# Patient Record
Sex: FEMALE | Race: WHITE | Employment: FULL TIME | URBAN - METROPOLITAN AREA
[De-identification: names, ages, dates, MRNs, and addresses within clinical notes are randomized per-mention and may not be internally consistent; named-entity substitution may affect disease eponyms.]

---

## 2017-01-25 ENCOUNTER — HOSPITAL ENCOUNTER (OUTPATIENT)
Dept: MAMMOGRAPHY | Age: 52
Discharge: HOME | End: 2017-01-25
Attending: INTERNAL MEDICINE
Payer: COMMERCIAL

## 2017-01-25 DIAGNOSIS — Z12.39 BREAST CANCER SCREENING: ICD-10-CM

## 2017-01-25 PROCEDURE — 77067 SCR MAMMO BI INCL CAD: CPT

## 2018-07-04 ENCOUNTER — HOSPITAL ENCOUNTER (EMERGENCY)
Age: 53
Discharge: HOME | End: 2018-07-04
Attending: EMERGENCY MEDICINE
Payer: COMMERCIAL

## 2018-07-04 ENCOUNTER — APPOINTMENT (OUTPATIENT)
Dept: CT IMAGING | Age: 53
End: 2018-07-04
Attending: EMERGENCY MEDICINE
Payer: COMMERCIAL

## 2018-07-04 ENCOUNTER — APPOINTMENT (OUTPATIENT)
Dept: GENERAL RADIOLOGY | Age: 53
End: 2018-07-04
Attending: EMERGENCY MEDICINE
Payer: COMMERCIAL

## 2018-07-04 VITALS
SYSTOLIC BLOOD PRESSURE: 112 MMHG | RESPIRATION RATE: 18 BRPM | OXYGEN SATURATION: 97 % | DIASTOLIC BLOOD PRESSURE: 58 MMHG | TEMPERATURE: 98.7 F | HEIGHT: 67 IN | BODY MASS INDEX: 29.03 KG/M2 | HEART RATE: 77 BPM | WEIGHT: 185 LBS

## 2018-07-04 DIAGNOSIS — K57.10 DIVERTICULOSIS OF SMALL INTESTINE WITHOUT HEMORRHAGE: Primary | ICD-10-CM

## 2018-07-04 DIAGNOSIS — K29.60 OTHER GASTRITIS WITHOUT HEMORRHAGE, UNSPECIFIED CHRONICITY: ICD-10-CM

## 2018-07-04 PROBLEM — K29.00 ACUTE GASTRITIS WITHOUT HEMORRHAGE: Status: ACTIVE | Noted: 2018-07-04

## 2018-07-04 PROBLEM — K57.12 DIVERTICULITIS OF JEJUNUM: Status: ACTIVE | Noted: 2018-07-04

## 2018-07-04 PROBLEM — R79.82 CRP ELEVATED: Status: ACTIVE | Noted: 2018-07-04

## 2018-07-04 PROBLEM — R10.13 EPIGASTRIC PAIN: Status: ACTIVE | Noted: 2018-07-04

## 2018-07-04 LAB
ALBUMIN SERPL-MCNC: 3.6 G/DL (ref 3.5–5)
ALBUMIN/GLOB SERPL: 1 {RATIO} (ref 1.2–3.5)
ALP SERPL-CCNC: 77 U/L (ref 50–136)
ALT SERPL-CCNC: 30 U/L (ref 12–65)
ANION GAP SERPL CALC-SCNC: 5 MMOL/L (ref 7–16)
AST SERPL-CCNC: 17 U/L (ref 15–37)
BASOPHILS # BLD: 0 K/UL (ref 0–0.2)
BASOPHILS NFR BLD: 0 % (ref 0–2)
BILIRUB SERPL-MCNC: 0.6 MG/DL (ref 0.2–1.1)
BUN SERPL-MCNC: 12 MG/DL (ref 6–23)
CALCIUM SERPL-MCNC: 9.2 MG/DL (ref 8.3–10.4)
CHLORIDE SERPL-SCNC: 102 MMOL/L (ref 98–107)
CO2 SERPL-SCNC: 28 MMOL/L (ref 21–32)
CREAT SERPL-MCNC: 0.82 MG/DL (ref 0.6–1)
CRP SERPL-MCNC: 15.7 MG/DL (ref 0–0.9)
DIFFERENTIAL METHOD BLD: ABNORMAL
EOSINOPHIL # BLD: 0.1 K/UL (ref 0–0.8)
EOSINOPHIL NFR BLD: 1 % (ref 0.5–7.8)
ERYTHROCYTE [DISTWIDTH] IN BLOOD BY AUTOMATED COUNT: 12.6 % (ref 11.9–14.6)
GLOBULIN SER CALC-MCNC: 3.7 G/DL (ref 2.3–3.5)
GLUCOSE SERPL-MCNC: 113 MG/DL (ref 65–100)
HCT VFR BLD AUTO: 40.7 % (ref 35.8–46.3)
HGB BLD-MCNC: 13.8 G/DL (ref 11.7–15.4)
IMM GRANULOCYTES # BLD: 0 K/UL (ref 0–0.5)
IMM GRANULOCYTES NFR BLD AUTO: 0 % (ref 0–5)
LACTATE BLD-SCNC: 1.2 MMOL/L (ref 0.5–1.9)
LIPASE SERPL-CCNC: 132 U/L (ref 73–393)
LYMPHOCYTES # BLD: 1.8 K/UL (ref 0.5–4.6)
LYMPHOCYTES NFR BLD: 16 % (ref 13–44)
MCH RBC QN AUTO: 30.1 PG (ref 26.1–32.9)
MCHC RBC AUTO-ENTMCNC: 33.9 G/DL (ref 31.4–35)
MCV RBC AUTO: 88.9 FL (ref 79.6–97.8)
MONOCYTES # BLD: 0.5 K/UL (ref 0.1–1.3)
MONOCYTES NFR BLD: 5 % (ref 4–12)
NEUTS SEG # BLD: 8.7 K/UL (ref 1.7–8.2)
NEUTS SEG NFR BLD: 78 % (ref 43–78)
PLATELET # BLD AUTO: 219 K/UL (ref 150–450)
PMV BLD AUTO: 9.9 FL (ref 10.8–14.1)
POTASSIUM SERPL-SCNC: 4 MMOL/L (ref 3.5–5.1)
PROT SERPL-MCNC: 7.3 G/DL (ref 6.3–8.2)
RBC # BLD AUTO: 4.58 M/UL (ref 4.05–5.25)
SODIUM SERPL-SCNC: 135 MMOL/L (ref 136–145)
WBC # BLD AUTO: 11.1 K/UL (ref 4.3–11.1)

## 2018-07-04 PROCEDURE — C9113 INJ PANTOPRAZOLE SODIUM, VIA: HCPCS | Performed by: SURGERY

## 2018-07-04 PROCEDURE — 86140 C-REACTIVE PROTEIN: CPT | Performed by: EMERGENCY MEDICINE

## 2018-07-04 PROCEDURE — 83605 ASSAY OF LACTIC ACID: CPT

## 2018-07-04 PROCEDURE — 85025 COMPLETE CBC W/AUTO DIFF WBC: CPT | Performed by: EMERGENCY MEDICINE

## 2018-07-04 PROCEDURE — 74022 RADEX COMPL AQT ABD SERIES: CPT

## 2018-07-04 PROCEDURE — 80053 COMPREHEN METABOLIC PANEL: CPT | Performed by: EMERGENCY MEDICINE

## 2018-07-04 PROCEDURE — 74011000250 HC RX REV CODE- 250: Performed by: EMERGENCY MEDICINE

## 2018-07-04 PROCEDURE — 99285 EMERGENCY DEPT VISIT HI MDM: CPT | Performed by: EMERGENCY MEDICINE

## 2018-07-04 PROCEDURE — 74011000250 HC RX REV CODE- 250: Performed by: SURGERY

## 2018-07-04 PROCEDURE — 74011250636 HC RX REV CODE- 250/636: Performed by: SURGERY

## 2018-07-04 PROCEDURE — 96375 TX/PRO/DX INJ NEW DRUG ADDON: CPT | Performed by: EMERGENCY MEDICINE

## 2018-07-04 PROCEDURE — 96365 THER/PROPH/DIAG IV INF INIT: CPT | Performed by: EMERGENCY MEDICINE

## 2018-07-04 PROCEDURE — 83690 ASSAY OF LIPASE: CPT | Performed by: EMERGENCY MEDICINE

## 2018-07-04 PROCEDURE — 74177 CT ABD & PELVIS W/CONTRAST: CPT

## 2018-07-04 PROCEDURE — 74011000258 HC RX REV CODE- 258: Performed by: SURGERY

## 2018-07-04 PROCEDURE — 81003 URINALYSIS AUTO W/O SCOPE: CPT | Performed by: EMERGENCY MEDICINE

## 2018-07-04 PROCEDURE — 74011636320 HC RX REV CODE- 636/320: Performed by: EMERGENCY MEDICINE

## 2018-07-04 PROCEDURE — 74011250636 HC RX REV CODE- 250/636: Performed by: EMERGENCY MEDICINE

## 2018-07-04 PROCEDURE — 74011250637 HC RX REV CODE- 250/637: Performed by: EMERGENCY MEDICINE

## 2018-07-04 PROCEDURE — 74011000258 HC RX REV CODE- 258: Performed by: EMERGENCY MEDICINE

## 2018-07-04 RX ORDER — KETOROLAC TROMETHAMINE 30 MG/ML
30 INJECTION, SOLUTION INTRAMUSCULAR; INTRAVENOUS
Status: COMPLETED | OUTPATIENT
Start: 2018-07-04 | End: 2018-07-04

## 2018-07-04 RX ORDER — LIDOCAINE HYDROCHLORIDE 20 MG/ML
15 SOLUTION OROPHARYNGEAL
Status: COMPLETED | OUTPATIENT
Start: 2018-07-04 | End: 2018-07-04

## 2018-07-04 RX ORDER — HYOSCYAMINE SULFATE 0.12 MG/1
0.12 TABLET SUBLINGUAL
Status: COMPLETED | OUTPATIENT
Start: 2018-07-04 | End: 2018-07-04

## 2018-07-04 RX ORDER — SODIUM CHLORIDE 0.9 % (FLUSH) 0.9 %
10 SYRINGE (ML) INJECTION
Status: COMPLETED | OUTPATIENT
Start: 2018-07-04 | End: 2018-07-04

## 2018-07-04 RX ORDER — SULFAMETHOXAZOLE AND TRIMETHOPRIM 800; 160 MG/1; MG/1
1 TABLET ORAL 2 TIMES DAILY
Qty: 14 TAB | Refills: 0 | Status: SHIPPED | OUTPATIENT
Start: 2018-07-04 | End: 2018-07-11

## 2018-07-04 RX ADMIN — LIDOCAINE HYDROCHLORIDE 15 ML: 20 SOLUTION ORAL; TOPICAL at 20:05

## 2018-07-04 RX ADMIN — SODIUM CHLORIDE 80 MG: 9 INJECTION, SOLUTION INTRAMUSCULAR; INTRAVENOUS; SUBCUTANEOUS at 21:01

## 2018-07-04 RX ADMIN — Medication 10 ML: at 18:54

## 2018-07-04 RX ADMIN — Medication 30 ML: at 20:05

## 2018-07-04 RX ADMIN — SODIUM CHLORIDE 100 ML: 900 INJECTION, SOLUTION INTRAVENOUS at 18:54

## 2018-07-04 RX ADMIN — HYOSCYAMINE SULFATE 0.12 MG: 0.12 TABLET ORAL; SUBLINGUAL at 19:10

## 2018-07-04 RX ADMIN — KETOROLAC TROMETHAMINE 30 MG: 30 INJECTION, SOLUTION INTRAMUSCULAR at 18:13

## 2018-07-04 RX ADMIN — IOPAMIDOL 100 ML: 755 INJECTION, SOLUTION INTRAVENOUS at 18:54

## 2018-07-04 RX ADMIN — SODIUM CHLORIDE 1 G: 900 INJECTION, SOLUTION INTRAVENOUS at 21:29

## 2018-07-04 NOTE — ED TRIAGE NOTES
Presents to ED with c/o gradual onset intermittent 8/10 stabbing epigastric pain radiating to mid lower back for approx. 24 hour. Pt reports nausea, but denies vomiting and diarrhea. Denies fever.

## 2018-07-04 NOTE — ED PROVIDER NOTES
HPI Comments: Patient presents with complaints of epigastric abdominal pain sharp and stabbing in nature radiating through to the back onset yesterday. Pain waxes and wanes but never resolves. She had some nausea yesterday but no vomiting no diarrhea no fever. Pain is worse with certain positions and with deep inspiration. Patient is a 47 y.o. female presenting with abdominal pain. The history is provided by the patient. Abdominal Pain    This is a new problem. The current episode started yesterday. The problem occurs constantly. The problem has not changed since onset. The pain is associated with an unknown factor. The pain is located in the epigastric region. The quality of the pain is sharp. The pain is moderate. Associated symptoms include nausea. Pertinent negatives include no anorexia, no fever, no belching, no diarrhea, no flatus, no hematochezia, no melena, no vomiting, no constipation, no dysuria, no frequency, no hematuria, no headaches, no arthralgias, no myalgias, no trauma, no chest pain and no back pain. Nothing worsens the pain. The pain is relieved by nothing. The patient's surgical history includes cholecystectomy.        Past Medical History:   Diagnosis Date    Arthritis     Asthma     no exacerbations since before 2008    H/O seasonal allergies        Past Surgical History:   Procedure Laterality Date    HX CHOLECYSTECTOMY      HX DILATION AND CURETTAGE  11/2010    HX HYSTERECTOMY  5/2011    No BSO         Family History:   Problem Relation Age of Onset    Cancer Maternal Grandmother      Colon    Cancer Paternal Grandmother      Breast    Breast Cancer Paternal Grandmother      Age unknown    Stroke Paternal Grandfather     Hypertension Mother     Anemia Mother     Hypertension Father     High Cholesterol Father     Coronary Artery Disease Brother        Social History     Social History    Marital status:      Spouse name: N/A    Number of children: N/A    Years of education: N/A     Occupational History    Not on file. Social History Main Topics    Smoking status: Never Smoker    Smokeless tobacco: Never Used    Alcohol use No    Drug use: No    Sexual activity: Not on file     Other Topics Concern    Not on file     Social History Narrative         ALLERGIES: Review of patient's allergies indicates no known allergies. Review of Systems   Constitutional: Negative for chills and fever. Cardiovascular: Negative for chest pain. Gastrointestinal: Positive for abdominal pain and nausea. Negative for anorexia, constipation, diarrhea, flatus, hematochezia, melena and vomiting. Genitourinary: Negative for dysuria, frequency and hematuria. Musculoskeletal: Negative for arthralgias, back pain and myalgias. Neurological: Negative for headaches. All other systems reviewed and are negative. Vitals:    07/04/18 1723 07/04/18 1738   BP:  136/85   Pulse: 86    Resp: 18    Temp: 98.7 °F (37.1 °C)    SpO2: 99%    Weight: 83.9 kg (185 lb)    Height: 5' 7\" (1.702 m)             Physical Exam   Constitutional: She is oriented to person, place, and time. She appears well-developed and well-nourished. No distress. HENT:   Head: Normocephalic and atraumatic. Mouth/Throat: No oropharyngeal exudate. Eyes: Conjunctivae and EOM are normal. Pupils are equal, round, and reactive to light. Neck: Normal range of motion. Neck supple. Cardiovascular: Normal rate, regular rhythm and normal heart sounds. Pulmonary/Chest: Effort normal and breath sounds normal.   Abdominal: Soft. Bowel sounds are normal. There is tenderness. Musculoskeletal: Normal range of motion. Neurological: She is alert and oriented to person, place, and time. Skin: Skin is warm and dry. Psychiatric: She has a normal mood and affect. Her behavior is normal.   Nursing note and vitals reviewed.        MDM  Number of Diagnoses or Management Options     Amount and/or Complexity of Data Reviewed  Clinical lab tests: ordered and reviewed  Tests in the radiology section of CPT®: ordered and reviewed  Independent visualization of images, tracings, or specimens: yes    Risk of Complications, Morbidity, and/or Mortality  Presenting problems: high  Diagnostic procedures: moderate  Management options: moderate    Patient Progress  Patient progress: stable        ED Course       Procedures      At reevaluation patient reports pain is decreased to 2/10 in intensity however repeat abdominal exam continues to demonstrate some epigastric left upper quadrant tenderness. Further history regarding NSAID use patient takes 81 mg of aspirin daily and one Celebrex. Patient has a abnormal CT report from radiology. Case was discussed with Dr. Namrata Whalen will be coming to evaluate the patient.

## 2018-07-05 NOTE — CONSULTS
H&P/Consult Note/Progress Note/Office Note:   Heriberto Lai  MRN: [id number]  :1965  Age:52 y.o.    HPI: Heriberto Lai is a 47 y.o. female who with h/o gastritis on celebrex and ASA daily who had at least 3 mixed drinks 4 days ago came to the ER with a 2 day history of intermittent, moderate epigastric pain with radiation to her back, which was worsened by moving and deep inspiration and not releived by anything. Pain is sharp and stabbing. She has associated nausea but no vomiting. She reports a history of arthritis in her hands and feet and previously was on arthritis medication (she did not remeber the name)  that she had to stop because of epigastric pain in the past.  She took Pepcid at that time and her symptoms resolved. She reports flatus and BM daily. A few weeks ago she began drinking a vinegar based drink frequently and had to stop secondary tot some epigastric pain. She has since been on Celebrex for about a year as well as ASA qday. She had 2 mixed drinks at least 4 days as mentioned previously. She was given Toradol by ER and surgery consult obtained. GI cocktail in ER resulted in immediate relief of her epigastric pain. She reports an unremarkable colonoscopy a few years ago here in [place] but does not remember the name of her GI physician. She is s/p lap andrea approx  by Dr Janell Weinstein  She reported she is s/p hysterectomy (ovaries remain) for benign disease related to dysfunctional bleeding by Dr Wes Ventura. 18 Abd Xray/Series  CLINICAL INDICATION:  Acute upper abdominal pain and nausea, pain radiates to  mid back, previous cholecystectomy and hysterectomy    FINDINGS: The lungs are well inflated. No evidence of infiltrate or effusion. Mediastinal contours are unremarkable.     Flat and upright views of the abdomen show no evidence of obstruction. A few nonspecific bowel loops are noted in the left abdomen.  No evidence of free air.  Cholecystectomy clips are noted. There is moderate to large volume stool consistent with constipation. No definite abnormal masses or calculi are seen. IMPRESSION:    1. No bowel obstruction or free air. 2. No acute findings in the chest.  3. Cholecystectomy    7/4/18 Ct abd/pelvis with IV but no oral contrast  Hx:  Acute moderate to severe stabbing epigastric pain with nausea, elevated CRP. Prior cholecystectomy.     FINDINGS: The partially seen lung bases are clear.     Abdomen:  No suspicious lesions in the liver or spleen. The adrenal glands and pancreas appear unremarkable. There is normal enhancement of the kidneys, no hydronephrosis. Cholecystectomy clips with no obvious complication.     No lymphadenopathy, free air, focal inflammatory changes or fluid collections in the abdomen. Aorta normal caliber. Patent major vessels.     In the left upper quadrant there are a few jejunal loops which are borderline dilated, with mild nonspecific wall thickening, some intraluminal debris, and trace surrounding mesenteric fluid. There is a well-defined 3 cm oval collection of fluid and gas adjacent to several small bowel loops but not definitely within the lumen, which may indicate a diverticulum or possibly abscess (axial image 31, coronal image 37). These are located lateral to the descending colon raising question of an internal hernia. There is no definite bowel obstruction. GI evaluation is limited without oral contrast. Moderately prominent stool throughout large bowel suggesting constipation. Retrocecal appendix appears unremarkable.     Pelvis:  No acute inflammatory changes or fluid collections in the pelvis. Uterus is absent. Ovaries are not enlarged. Urinary bladder is mostly decompressed.  No lymphadenopathy or mass.     Bones: Small sclerotic focus in the right lateral sacrum is of doubtful significance,   possibly bone island or chronic degenerative spurring No acute osseous lesions are seen.      IMPRESSION:   1. Left upper abdominal small bowel loops are abnormal and nonspecific. Possible small abscess or diverticulum. GI tract and adjacent structures are limited without oral contrast.  2. Cholecystectomy. 3. Partial hysterectomy            Past Medical History:   Diagnosis Date    Arthritis     Asthma     no exacerbations since before 2008    H/O seasonal allergies      Past Surgical History:   Procedure Laterality Date    HX CHOLECYSTECTOMY      HX DILATION AND CURETTAGE  11/2010    HX HYSTERECTOMY  5/2011    No BSO     Current Facility-Administered Medications   Medication Dose Route Frequency    GI Cocktail  30 mL Oral Q6H    pantoprazole (PROTONIX) 80 mg in sodium chloride 0.9% 10 mL injection  80 mg IntraVENous NOW    ertapenem (INVANZ) 1 g in 0.9% sodium chloride (MBP/ADV) 50 mL MBP  1 g IntraVENous Q24H     Current Outpatient Prescriptions   Medication Sig    meloxicam (MOBIC) 7.5 mg tablet Take  by mouth daily.  OMEPRAZOLE PO Take  by mouth. Indications: OTC    celecoxib (CELEBREX) 100 mg capsule Take 1 Cap by mouth two (2) times daily as needed for Pain.  predniSONE (DELTASONE) 10 mg tablet 4 PO QD x 2, 3 PO QD x 2, 2 PO QD x 2, 1 PO QD x 2    ibuprofen (MOTRIN) 200 mg tablet Take  by mouth.  loratadine (CLARITIN) 10 mg tablet Take 10 mg by mouth daily. Instructed to take DOS per anesthesia guidelines      multivitamin capsule Take 1 Cap by mouth daily.  calcium-vitamin D (CALCIUM 500+D) 500 mg(1,250mg) -200 unit per tablet Take 1 Tab by mouth daily.  aspirin delayed-release 81 mg tablet Take 81 mg by mouth daily. Review of patient's allergies indicates no known allergies.   Social History     Social History    Marital status:      Spouse name: N/A    Number of children: N/A    Years of education: N/A     Social History Main Topics    Smoking status: Never Smoker    Smokeless tobacco: Never Used    Alcohol use No    Drug use: No    Sexual activity: Not on file     Other Topics Concern    Not on file     Social History Narrative     History   Smoking Status    Never Smoker   Smokeless Tobacco    Never Used     Family History   Problem Relation Age of Onset    Cancer Maternal Grandmother      Colon    Cancer Paternal Grandmother      Breast    Breast Cancer Paternal [de-identified]      Age unknown    Stroke Paternal Grandfather     Hypertension Mother     Anemia Mother     Hypertension Father     High Cholesterol Father     Coronary Artery Disease Brother      ROS: The patient has no difficulty with chest pain or shortness of breath. No fever or chills. Comprehensive review of systems was otherwise unremarkable except as noted above. Physical Exam:   Visit Vitals    /85    Pulse 86    Temp 98.7 °F (37.1 °C)    Resp 18    Ht 5' 7\" (1.702 m)    Wt 185 lb (83.9 kg)    SpO2 99%    BMI 28.98 kg/m2     Constitutional: Alert, oriented, cooperative patient in no acute distress; appears stated age    Eyes:Sclera are clear. EOMs intact  ENMT: no external lesions gross hearing normal; no obvious neck masses, no ear or lip lesions, nares normal  CV: RRR. Normal perfusion  Resp: No JVD. Breathing is  non-labored; no audible wheezing. GI: soft and non-distended; epigastric tenderness to deep palpation    Musculoskeletal: unremarkable with normal function. No embolic signs or cyanosis.    Neuro:  Oriented; moves all 4; no focal deficits  Psychiatric: normal affect and mood, no memory impairment    Recent vitals (if inpt):  Patient Vitals for the past 24 hrs:   BP Temp Pulse Resp SpO2 Height Weight   07/04/18 1738 136/85 - - - - - -   07/04/18 1723 - 98.7 °F (37.1 °C) 86 18 99 % 5' 7\" (1.702 m) 185 lb (83.9 kg)       Labs:  Recent Labs      07/04/18   1807   WBC  11.1   HGB  13.8   PLT  219   NA  135*   K  4.0   CL  102   CO2  28   BUN  12   CREA  0.82   GLU  113*   TBILI  0.6   SGOT  17   ALT  30   AP  77   LPSE  132       Lab Results Component Value Date/Time    WBC 11.1 07/04/2018 06:07 PM    HGB 13.8 07/04/2018 06:07 PM    PLATELET 588 95/78/2897 06:07 PM    Sodium 135 (L) 07/04/2018 06:07 PM    Potassium 4.0 07/04/2018 06:07 PM    Chloride 102 07/04/2018 06:07 PM    CO2 28 07/04/2018 06:07 PM    BUN 12 07/04/2018 06:07 PM    Creatinine 0.82 07/04/2018 06:07 PM    Glucose 113 (H) 07/04/2018 06:07 PM    INR 1.0 04/21/2011 09:16 AM    aPTT 25.7 04/21/2011 09:16 AM    Bilirubin, total 0.6 07/04/2018 06:07 PM    Bilirubin, direct 0.1 08/11/2014 11:58 AM    AST (SGOT) 17 07/04/2018 06:07 PM    ALT (SGPT) 30 07/04/2018 06:07 PM    Alk. phosphatase 77 07/04/2018 06:07 PM    Lipase 132 07/04/2018 06:07 PM       I reviewed recent labs and recent radiologic studies. I independently reviewed radiology images for studies I described above or studies I have ordered. Admission date (for inpatients): 7/4/2018   * No surgery found *  * No surgery found *    ASSESSMENT/PLAN:  Problem List  Date Reviewed: 8/11/2014          Codes Class Noted    Epigastric pain ICD-10-CM: R10.13  ICD-9-CM: 789.06  7/4/2018        CRP elevated ICD-10-CM: R79.82  ICD-9-CM: 790.95  7/4/2018        Diverticulitis of jejunum ICD-10-CM: K57.12  ICD-9-CM: 562.01  7/4/2018        * (Principal)Acute gastritis without hemorrhage ICD-10-CM: K29.00  ICD-9-CM: 535.00  7/4/2018        Arthritis ICD-10-CM: M19.90  ICD-9-CM: 716.90  Unknown    Overview Signed 4/17/2018 11:48 AM by Martin Starkey 150, knees, ankles, feet             OA (osteoarthritis) ICD-10-CM: M19.90  ICD-9-CM: 715.90  Unknown            Principal Problem:    Acute gastritis without hemorrhage (7/4/2018)    Active Problems:    Epigastric pain (7/4/2018)      CRP elevated (7/4/2018)      Diverticulitis of jejunum (7/4/2018)       Findings and response to GI cocktail suggestive of gastritis  Protonix IV ordered in ER  I asked her to avoid alcohol, and temporarily stop ASA and celebrex.   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   I placed her on oral ABx (Bactrim) for now after Invanz x 1 in ER            Signed:  Mikey Hardy MD,  FACS

## 2018-07-05 NOTE — ED NOTES
I have reviewed discharge instructions with the patient. The patient verbalized understanding. Patient left ED via Discharge Method: ambulatory to Home with (). Opportunity for questions and clarification provided. Patient given 1 scripts. To continue your aftercare when you leave the hospital, you may receive an automated call from our care team to check in on how you are doing. This is a free service and part of our promise to provide the best care and service to meet your aftercare needs.  If you have questions, or wish to unsubscribe from this service please call [phone]. Thank you for Choosing our [facility].

## 2018-07-05 NOTE — DISCHARGE INSTRUCTIONS
Diverticulosis: Care Instructions  Your Care Instructions  In diverticulosis, pouches called diverticula form in the wall of the large intestine (colon). The pouches do not cause any pain or other symptoms. Most people who have diverticulosis do not know they have it. But the pouches sometimes bleed, and if they become infected, they can cause pain and other symptoms. When this happens, it is called diverticulitis. Diverticula form when pressure pushes the wall of the colon outward at certain weak points. A diet that is too low in fiber can cause diverticula. Follow-up care is a key part of your treatment and safety. Be sure to make and go to all appointments, and call your doctor if you are having problems. It's also a good idea to know your test results and keep a list of the medicines you take. How can you care for yourself at home? · Include fruits, leafy green vegetables, beans, and whole grains in your diet each day. These foods are high in fiber. · Take a fiber supplement, such as Citrucel or Metamucil, every day if needed. Read and follow all instructions on the label. · Drink plenty of fluids, enough so that your urine is light yellow or clear like water. If you have kidney, heart, or liver disease and have to limit fluids, talk with your doctor before you increase the amount of fluids you drink. · Get at least 30 minutes of exercise on most days of the week. Walking is a good choice. You also may want to do other activities, such as running, swimming, cycling, or playing tennis or team sports. · Cut out foods that cause gas, pain, or other symptoms. When should you call for help? Call your doctor now or seek immediate medical care if:  ? · You have belly pain. ? · You pass maroon or very bloody stools. ? · You have a fever. ? · You have nausea and vomiting. ? · You have unusual changes in your bowel movements or abdominal swelling. ? · You have burning pain when you urinate.    ? · You have abnormal vaginal discharge. ? · You have shoulder pain. ? · You have cramping pain that does not get better when you have a bowel movement or pass gas. ? · You pass gas or stool from your urethra while urinating. ? Watch closely for changes in your health, and be sure to contact your doctor if you have any problems. Where can you learn more? Go to http://noa-adria.info/. Enter B711 in the search box to learn more about \"Diverticulosis: Care Instructions. \"  Current as of: May 12, 2017  Content Version: 11.4  © 4749-0466 [employer]. Care instructions adapted under license by [employer] (which disclaims liability or warranty for this information). If you have questions about a medical condition or this instruction, always ask your healthcare professional. [address] any warranty or liability for your use of this information. Gastritis: Care Instructions  Your Care Instructions    Gastritis is a sore and upset stomach. It happens when something irritates the stomach lining. Many things can cause it. These include an infection such as the flu or something you ate or drank. Medicines or a sore on the lining of the stomach (ulcer) also can cause it. Your belly may bloat and ache. You may belch, vomit, and feel sick to your stomach. You should be able to relieve the problem by taking medicine. And it may help to change your diet. If gastritis lasts, your doctor may prescribe medicine. Follow-up care is a key part of your treatment and safety. Be sure to make and go to all appointments, and call your doctor if you are having problems. It's also a good idea to know your test results and keep a list of the medicines you take. How can you care for yourself at home? · If your doctor prescribed antibiotics, take them as directed. Do not stop taking them just because you feel better.  You need to take the full course of antibiotics. · Be safe with medicines. If your doctor prescribed medicine to decrease stomach acid, take it as directed. Call your doctor if you think you are having a problem with your medicine. · Do not take any other medicine, including over-the-counter pain relievers, without talking to your doctor first.  · If your doctor recommends over-the-counter medicine to reduce stomach acid, such as Pepcid AC, Prilosec, Tagamet HB, or Zantac 75, follow the directions on the label. · Drink plenty of fluids (enough so that your urine is light yellow or clear like water) to prevent dehydration. Choose water and other caffeine-free clear liquids. If you have kidney, heart, or liver disease and have to limit fluids, talk with your doctor before you increase the amount of fluids you drink. · Limit how much alcohol you drink. · Avoid coffee, tea, cola drinks, chocolate, and other foods with caffeine. They increase stomach acid. When should you call for help? Call 911 anytime you think you may need emergency care. For example, call if:  ? · You vomit blood or what looks like coffee grounds. ? · You pass maroon or very bloody stools. ?Call your doctor now or seek immediate medical care if:  ? · You start breathing fast and have not produced urine in the last 8 hours. ? · You cannot keep fluids down. ? Watch closely for changes in your health, and be sure to contact your doctor if:  ? · You do not get better as expected. Where can you learn more? Go to http://noa-adria.info/. Enter [id number] in the search box to learn more about \"Gastritis: Care Instructions. \"  Current as of: May 12, 2017  Content Version: 11.4  © 4689-4134 [employer]. Care instructions adapted under license by [employer] (which disclaims liability or warranty for this information).  If you have questions about a medical condition or this instruction, always ask your healthcare professional. [employer] disclaims any warranty or liability for your use of this information. Avoid vinegar, smoking, alcohol, aspirin, and NSAIDS  Stop Celebrex for now.     Take Prilosec 20mg twice a day  Liquid diet for a few days  Bactrim (antibiotic) every 12 hrs    Follow-up with your Gastroenterologist in next 3-4 weeks to consider EGD for suspected gastritis    Follow-up with Dr Power Foster (Gen Surgery) to consider repeat CT scan abd/pelvis (with oral and IV contrast)   in 2-3 weeks or sooner if needed in the office at:  [address], [address]  (Call for an appt time-->[phone]-->option 1)

## 2018-07-30 ENCOUNTER — HOSPITAL ENCOUNTER (OUTPATIENT)
Dept: CT IMAGING | Age: 53
Discharge: HOME | End: 2018-07-30
Payer: COMMERCIAL

## 2018-07-30 DIAGNOSIS — R10.84 GENERALIZED ABDOMINAL PAIN: ICD-10-CM

## 2018-07-30 DIAGNOSIS — K57.92 DIVERTICULITIS OF INTESTINE, PART UNSPECIFIED, WITHOUT PERFORATION OR ABSCESS WITHOUT BLEEDING: ICD-10-CM

## 2018-07-30 PROCEDURE — 74011636320 HC RX REV CODE- 636/320

## 2018-07-30 PROCEDURE — 74011000258 HC RX REV CODE- 258

## 2018-07-30 PROCEDURE — 74177 CT ABD & PELVIS W/CONTRAST: CPT

## 2018-07-30 RX ORDER — SODIUM CHLORIDE 0.9 % (FLUSH) 0.9 %
10 SYRINGE (ML) INJECTION
Status: COMPLETED | OUTPATIENT
Start: 2018-07-30 | End: 2018-07-30

## 2018-07-30 RX ADMIN — DIATRIZOATE MEGLUMINE AND DIATRIZOATE SODIUM 15 ML: 660; 100 LIQUID ORAL; RECTAL at 12:18

## 2018-07-30 RX ADMIN — IOPAMIDOL 100 ML: 755 INJECTION, SOLUTION INTRAVENOUS at 12:18

## 2018-07-30 RX ADMIN — SODIUM CHLORIDE 100 ML: 900 INJECTION, SOLUTION INTRAVENOUS at 12:18

## 2018-07-30 RX ADMIN — Medication 10 ML: at 12:18

## 2018-08-16 ENCOUNTER — HOSPITAL ENCOUNTER (OUTPATIENT)
Dept: MAMMOGRAPHY | Age: 53
Discharge: HOME | End: 2018-08-16
Attending: INTERNAL MEDICINE
Payer: COMMERCIAL

## 2018-08-16 DIAGNOSIS — Z12.39 BREAST CANCER SCREENING: ICD-10-CM

## 2018-08-16 PROCEDURE — 77067 SCR MAMMO BI INCL CAD: CPT

## 2019-06-19 ENCOUNTER — ANESTHESIA EVENT (OUTPATIENT)
Dept: SURGERY | Age: 54
End: 2019-06-19
Payer: COMMERCIAL

## 2019-06-19 RX ORDER — SODIUM CHLORIDE 0.9 % (FLUSH) 0.9 %
5-40 SYRINGE (ML) INJECTION EVERY 8 HOURS
Status: CANCELLED | OUTPATIENT
Start: 2019-06-19

## 2019-06-19 RX ORDER — SODIUM CHLORIDE 0.9 % (FLUSH) 0.9 %
5-40 SYRINGE (ML) INJECTION AS NEEDED
Status: CANCELLED | OUTPATIENT
Start: 2019-06-19

## 2019-06-19 NOTE — H&P
Laureano Edgecomb Imanioedonny  1965  female    Follow up:  left index finger infection    HPI: Patient is a 46year old female who is following up for left index finger infection. Last visit we provided wound care. The current symptoms are no better. She put her work in order and now wants to proceed with surgery. ?????. Past Medical and Surgical, Family and Social History: This has been reviewed and documented on the patient intake form. See scanned documents for further information. Medications: Bactrim DS (800-160 MG, Take 1 tablet(s) by mouth 2 times a day for 10 days); Norco (5-325 MG, Take 1 tablet(s) by mouth every 4-6 hours as needed [PRN])  Allergies: NKDA  All medical history, medications and allergies have been discussed with the patient today. ROS:  This has been reviewed and documented on the patient intake form. See scanned documents for further information. Patient reports no change in past medical & surgical history, medications, allergies, social history, family history and review of systems since last visit    Physical Examination: Patient is a healthy appearing female in no acute distress. Bilateral upper limbs have equal and intact peripheral pulses. Skin does not demonstrate any rashes or lesions. Pain with motion of the left index DIP joint, purulent discharge from the dorsal wound, swelling dorsally, no pain volarly, no pain along flexor tendon sheath. Imaging:  None  ? Assessment: M00.9 Pyogenic arthritis, unspecified   M67.442 Mucous cyst of digit of left hand   [id number] Paronychia     Plan: We discussed the diagnosis and different treatment options. ?????. After thorough discussion, the patient elects to proceed with LEFT INDEX FINGER IRRIGATION AND DEBRIDEMENT. Patient voiced accordance and understanding of the information provided and the formulated plan. All questions were answered to the patient's satisfaction during the encounter. ?????         Patient understands risks and benefits of LEFT INDEX FINGER IRRIGATION AND DEBRIDEMENT including but not limited to nerve injury, vessel injury, infection, failure to achieve desired results and possible need for additional surgery. Patient understands and wishes to proceed with surgery.     On Exam:   The patient is alert and oriented; ;   Lung auscultation is clear bilaterally   Heart has RRR without murmurs    Jenise Cleary MD  06/19/19  4:23 PM

## 2019-06-20 ENCOUNTER — HOSPITAL ENCOUNTER (OUTPATIENT)
Age: 54
Setting detail: OUTPATIENT SURGERY
Discharge: HOME | End: 2019-06-20
Attending: ORTHOPAEDIC SURGERY | Admitting: ORTHOPAEDIC SURGERY
Payer: COMMERCIAL

## 2019-06-20 ENCOUNTER — ANESTHESIA (OUTPATIENT)
Dept: SURGERY | Age: 54
End: 2019-06-20
Payer: COMMERCIAL

## 2019-06-20 VITALS
TEMPERATURE: 97.5 F | DIASTOLIC BLOOD PRESSURE: 77 MMHG | OXYGEN SATURATION: 98 % | BODY MASS INDEX: 28.98 KG/M2 | SYSTOLIC BLOOD PRESSURE: 140 MMHG | HEART RATE: 66 BPM | WEIGHT: 185 LBS | RESPIRATION RATE: 16 BRPM

## 2019-06-20 PROCEDURE — 74011250636 HC RX REV CODE- 250/636: Performed by: ANESTHESIOLOGY

## 2019-06-20 PROCEDURE — 87075 CULTR BACTERIA EXCEPT BLOOD: CPT

## 2019-06-20 PROCEDURE — 76060000032 HC ANESTHESIA 0.5 TO 1 HR: Performed by: ORTHOPAEDIC SURGERY

## 2019-06-20 PROCEDURE — 74011250636 HC RX REV CODE- 250/636: Performed by: ORTHOPAEDIC SURGERY

## 2019-06-20 PROCEDURE — 76010010054 HC POST OP PAIN BLOCK: Performed by: ORTHOPAEDIC SURGERY

## 2019-06-20 PROCEDURE — 77030002888 HC SUT CHRMC J&J -A: Performed by: ORTHOPAEDIC SURGERY

## 2019-06-20 PROCEDURE — 87077 CULTURE AEROBIC IDENTIFY: CPT

## 2019-06-20 PROCEDURE — 87205 SMEAR GRAM STAIN: CPT

## 2019-06-20 PROCEDURE — 87102 FUNGUS ISOLATION CULTURE: CPT

## 2019-06-20 PROCEDURE — 77030002933 HC SUT MCRYL J&J -A: Performed by: ORTHOPAEDIC SURGERY

## 2019-06-20 PROCEDURE — 87176 TISSUE HOMOGENIZATION CULTR: CPT

## 2019-06-20 PROCEDURE — 76942 ECHO GUIDE FOR BIOPSY: CPT | Performed by: ORTHOPAEDIC SURGERY

## 2019-06-20 PROCEDURE — 87186 SC STD MICRODIL/AGAR DIL: CPT

## 2019-06-20 PROCEDURE — 87106 FUNGI IDENTIFICATION YEAST: CPT

## 2019-06-20 PROCEDURE — 74011250636 HC RX REV CODE- 250/636

## 2019-06-20 PROCEDURE — 76210000063 HC OR PH I REC FIRST 0.5 HR: Performed by: ORTHOPAEDIC SURGERY

## 2019-06-20 PROCEDURE — 76010000138 HC OR TIME 0.5 TO 1 HR: Performed by: ORTHOPAEDIC SURGERY

## 2019-06-20 PROCEDURE — 74011000250 HC RX REV CODE- 250: Performed by: ORTHOPAEDIC SURGERY

## 2019-06-20 PROCEDURE — 77030013479 HC CUF TRNQ COT DGT MARM -A: Performed by: ORTHOPAEDIC SURGERY

## 2019-06-20 PROCEDURE — 76210000021 HC REC RM PH II 0.5 TO 1 HR: Performed by: ORTHOPAEDIC SURGERY

## 2019-06-20 PROCEDURE — 77030002916 HC SUT ETHLN J&J -A: Performed by: ORTHOPAEDIC SURGERY

## 2019-06-20 PROCEDURE — 74011250637 HC RX REV CODE- 250/637: Performed by: ANESTHESIOLOGY

## 2019-06-20 RX ORDER — FENTANYL CITRATE 50 UG/ML
INJECTION, SOLUTION INTRAMUSCULAR; INTRAVENOUS AS NEEDED
Status: DISCONTINUED | OUTPATIENT
Start: 2019-06-20 | End: 2019-06-20 | Stop reason: HOSPADM

## 2019-06-20 RX ORDER — SODIUM CHLORIDE, SODIUM LACTATE, POTASSIUM CHLORIDE, CALCIUM CHLORIDE 600; 310; 30; 20 MG/100ML; MG/100ML; MG/100ML; MG/100ML
1000 INJECTION, SOLUTION INTRAVENOUS CONTINUOUS
Status: DISCONTINUED | OUTPATIENT
Start: 2019-06-20 | End: 2019-06-20 | Stop reason: HOSPADM

## 2019-06-20 RX ORDER — BACITRACIN 50000 [IU]/1
INJECTION, POWDER, FOR SOLUTION INTRAMUSCULAR AS NEEDED
Status: DISCONTINUED | OUTPATIENT
Start: 2019-06-20 | End: 2019-06-20 | Stop reason: HOSPADM

## 2019-06-20 RX ORDER — SODIUM CHLORIDE 0.9 % (FLUSH) 0.9 %
5-40 SYRINGE (ML) INJECTION EVERY 8 HOURS
Status: DISCONTINUED | OUTPATIENT
Start: 2019-06-20 | End: 2019-06-20 | Stop reason: HOSPADM

## 2019-06-20 RX ORDER — MIDAZOLAM HYDROCHLORIDE 1 MG/ML
2 INJECTION, SOLUTION INTRAMUSCULAR; INTRAVENOUS
Status: DISCONTINUED | OUTPATIENT
Start: 2019-06-20 | End: 2019-06-20 | Stop reason: HOSPADM

## 2019-06-20 RX ORDER — SODIUM CHLORIDE, SODIUM LACTATE, POTASSIUM CHLORIDE, CALCIUM CHLORIDE 600; 310; 30; 20 MG/100ML; MG/100ML; MG/100ML; MG/100ML
INJECTION, SOLUTION INTRAVENOUS
Status: DISCONTINUED | OUTPATIENT
Start: 2019-06-20 | End: 2019-06-20 | Stop reason: HOSPADM

## 2019-06-20 RX ORDER — ACETAMINOPHEN 500 MG
1000 TABLET ORAL ONCE
Status: COMPLETED | OUTPATIENT
Start: 2019-06-20 | End: 2019-06-20

## 2019-06-20 RX ORDER — CEFAZOLIN SODIUM/WATER 2 G/20 ML
2 SYRINGE (ML) INTRAVENOUS ONCE
Status: COMPLETED | OUTPATIENT
Start: 2019-06-20 | End: 2019-06-20

## 2019-06-20 RX ORDER — SODIUM CHLORIDE 0.9 % (FLUSH) 0.9 %
5-40 SYRINGE (ML) INJECTION AS NEEDED
Status: DISCONTINUED | OUTPATIENT
Start: 2019-06-20 | End: 2019-06-20 | Stop reason: HOSPADM

## 2019-06-20 RX ORDER — ALBUTEROL SULFATE 0.83 MG/ML
2.5 SOLUTION RESPIRATORY (INHALATION) AS NEEDED
Status: DISCONTINUED | OUTPATIENT
Start: 2019-06-20 | End: 2019-06-20 | Stop reason: HOSPADM

## 2019-06-20 RX ORDER — ONDANSETRON 2 MG/ML
4 INJECTION INTRAMUSCULAR; INTRAVENOUS
Status: DISCONTINUED | OUTPATIENT
Start: 2019-06-20 | End: 2019-06-20 | Stop reason: HOSPADM

## 2019-06-20 RX ORDER — LIDOCAINE HYDROCHLORIDE 20 MG/ML
INJECTION, SOLUTION EPIDURAL; INFILTRATION; INTRACAUDAL; PERINEURAL AS NEEDED
Status: DISCONTINUED | OUTPATIENT
Start: 2019-06-20 | End: 2019-06-20 | Stop reason: HOSPADM

## 2019-06-20 RX ORDER — ONDANSETRON 2 MG/ML
INJECTION INTRAMUSCULAR; INTRAVENOUS AS NEEDED
Status: DISCONTINUED | OUTPATIENT
Start: 2019-06-20 | End: 2019-06-20 | Stop reason: HOSPADM

## 2019-06-20 RX ORDER — KETOROLAC TROMETHAMINE 30 MG/ML
INJECTION, SOLUTION INTRAMUSCULAR; INTRAVENOUS AS NEEDED
Status: DISCONTINUED | OUTPATIENT
Start: 2019-06-20 | End: 2019-06-20 | Stop reason: HOSPADM

## 2019-06-20 RX ORDER — HYDROMORPHONE HYDROCHLORIDE 2 MG/ML
0.5 INJECTION, SOLUTION INTRAMUSCULAR; INTRAVENOUS; SUBCUTANEOUS
Status: DISCONTINUED | OUTPATIENT
Start: 2019-06-20 | End: 2019-06-20 | Stop reason: HOSPADM

## 2019-06-20 RX ORDER — PROPOFOL 10 MG/ML
INJECTION, EMULSION INTRAVENOUS AS NEEDED
Status: DISCONTINUED | OUTPATIENT
Start: 2019-06-20 | End: 2019-06-20 | Stop reason: HOSPADM

## 2019-06-20 RX ORDER — PROPOFOL 10 MG/ML
INJECTION, EMULSION INTRAVENOUS
Status: DISCONTINUED | OUTPATIENT
Start: 2019-06-20 | End: 2019-06-20 | Stop reason: HOSPADM

## 2019-06-20 RX ORDER — BUPIVACAINE HYDROCHLORIDE 5 MG/ML
INJECTION, SOLUTION EPIDURAL; INTRACAUDAL AS NEEDED
Status: DISCONTINUED | OUTPATIENT
Start: 2019-06-20 | End: 2019-06-20 | Stop reason: HOSPADM

## 2019-06-20 RX ORDER — LIDOCAINE HYDROCHLORIDE 10 MG/ML
0.1 INJECTION INFILTRATION; PERINEURAL AS NEEDED
Status: DISCONTINUED | OUTPATIENT
Start: 2019-06-20 | End: 2019-06-20 | Stop reason: HOSPADM

## 2019-06-20 RX ORDER — DEXAMETHASONE SODIUM PHOSPHATE 4 MG/ML
INJECTION, SOLUTION INTRA-ARTICULAR; INTRALESIONAL; INTRAMUSCULAR; INTRAVENOUS; SOFT TISSUE AS NEEDED
Status: DISCONTINUED | OUTPATIENT
Start: 2019-06-20 | End: 2019-06-20 | Stop reason: HOSPADM

## 2019-06-20 RX ORDER — LIDOCAINE HYDROCHLORIDE 10 MG/ML
INJECTION INFILTRATION; PERINEURAL AS NEEDED
Status: DISCONTINUED | OUTPATIENT
Start: 2019-06-20 | End: 2019-06-20 | Stop reason: HOSPADM

## 2019-06-20 RX ADMIN — KETOROLAC TROMETHAMINE 30 MG: 30 INJECTION, SOLUTION INTRAMUSCULAR; INTRAVENOUS at 08:24

## 2019-06-20 RX ADMIN — SODIUM CHLORIDE, SODIUM LACTATE, POTASSIUM CHLORIDE, CALCIUM CHLORIDE: 600; 310; 30; 20 INJECTION, SOLUTION INTRAVENOUS at 08:04

## 2019-06-20 RX ADMIN — ONDANSETRON 4 MG: 2 INJECTION INTRAMUSCULAR; INTRAVENOUS at 08:17

## 2019-06-20 RX ADMIN — ACETAMINOPHEN 1000 MG: 500 TABLET, FILM COATED ORAL at 07:09

## 2019-06-20 RX ADMIN — LIDOCAINE HYDROCHLORIDE 60 MG: 20 INJECTION, SOLUTION EPIDURAL; INFILTRATION; INTRACAUDAL; PERINEURAL at 08:07

## 2019-06-20 RX ADMIN — Medication 0.5 G: at 08:06

## 2019-06-20 RX ADMIN — PROPOFOL 20 MG: 10 INJECTION, EMULSION INTRAVENOUS at 08:07

## 2019-06-20 RX ADMIN — Medication 0.5 G: at 08:08

## 2019-06-20 RX ADMIN — PROPOFOL 100 MCG/KG/MIN: 10 INJECTION, EMULSION INTRAVENOUS at 08:10

## 2019-06-20 RX ADMIN — DEXAMETHASONE SODIUM PHOSPHATE 4 MG: 4 INJECTION, SOLUTION INTRA-ARTICULAR; INTRALESIONAL; INTRAMUSCULAR; INTRAVENOUS; SOFT TISSUE at 08:17

## 2019-06-20 RX ADMIN — FENTANYL CITRATE 25 MCG: 50 INJECTION, SOLUTION INTRAMUSCULAR; INTRAVENOUS at 08:07

## 2019-06-20 RX ADMIN — SODIUM CHLORIDE, SODIUM LACTATE, POTASSIUM CHLORIDE, AND CALCIUM CHLORIDE 1000 ML: 600; 310; 30; 20 INJECTION, SOLUTION INTRAVENOUS at 07:10

## 2019-06-20 RX ADMIN — Medication 0.5 G: at 08:09

## 2019-06-20 RX ADMIN — Medication 0.5 G: at 08:07

## 2019-06-20 NOTE — ANESTHESIA PREPROCEDURE EVALUATION
Relevant Problems   No relevant active problems       Anesthetic History   No history of anesthetic complications            Review of Systems / Medical History  Patient summary reviewed and pertinent labs reviewed    Pulmonary                   Neuro/Psych   Within defined limits           Cardiovascular                  Exercise tolerance: >4 METS     GI/Hepatic/Renal                Endo/Other        Arthritis     Other Findings              Physical Exam    Airway  Mallampati: II  TM Distance: 4 - 6 cm  Neck ROM: normal range of motion   Mouth opening: Normal    Comments: Small chin, small mouth opening Cardiovascular    Rhythm: regular  Rate: normal      Pertinent negatives: No murmur   Dental  No notable dental hx       Pulmonary  Breath sounds clear to auscultation               Abdominal         Other Findings            Anesthetic Plan    ASA: 2  Anesthesia type: total IV anesthesia          Induction: Intravenous  Anesthetic plan and risks discussed with: Patient

## 2019-06-20 NOTE — DISCHARGE INSTRUCTIONS
Postoperative  Instructions:      Weightbearing or Lifting:  Limit  weight  lifting  to  less  than  2  pounds  (coffee  mug)  for  the  first  2  weeks  after  surgery. Dressing  instructions:    Keep  your  dressing  and/or  splint  clean  and  dry  at  all  times. You  can  remove  your  dressing  on  post-operative  day  #1 and start Hibiclens soaks TID as previously discussed. Showering  Instructions:  May  shower  But keep surgical dressing clean and dry until removed as explained above. After dressing is removed, you may allows soapy water to run through the incision during showers but do not scrub. After each shower, pat dry and apply a dry dressing. Do  not  soak  your  Incision in still water or bathtub  for  3  weeks  after  surgery. If  the  incision  gets  wet otherwise,  pat  dry  and  do  not  scrub  the  incision. Do  not  apply  cream  or  lotion  to  incision      Pain  Control:  - You  have  been  given  a  prescription  to  be  taken  as  directed  for  post-operative  pain  control. In  addition,  elevate  the  operative  extremity  above  the  heart  at  all  times  to  prevent  swelling  and  throbbing  pain. - If you develop constipation while taking narcotic pain medications (Norco, Hydrocodone, Percocet, Oxycodone, Dilaudid, Hydromorphone) take  over-the-counter  Colace,  100mg  by  mouth  twice  a  Day. - Nausea  is  a  common  side  effect  of  many  pain  medications. You  will  want  to  eat something  before  taking  your  pain  medicine  to  help  prevent  Nausea. - If  you  are  taking  a  prescription  pain  medication  that  contains  acetaminophen,  we  recommend  that  you  do  not  take  additional  over  the  counter  acetaminophen  (Tylenol®).       Other  pain  relieving  options:   - Using  a  cold  pack  to  ice  the  affected  area  a  few  times  a  day  (15  to  20  minutes  at  a  time)  can  help  to  relieve  pain,  reduce  swelling  and bruising.      - Elevation  of  the  affected  area  can  also  help  to  reduce  pain  and  swelling. Did  you  receive  a  nerve  Block? A  nerve  block  can  provide  pain  relief  for  one  hour  to  two  days  after  your  surgery. As  long  as  the  nerve  block  is  working,  you  will  experience  little  or  no  sensation  in  the  area  the  surgeon  operated  on. As  the  nerve  block  wears  off,  you  will  begin  to  experience  pain  or  discomfort. It  is  very  important  that  you  begin  taking  your  prescribed  pain  medication  before  the  nerve  block  fully  wears  off. The first sign that the nerve block is wearing off is tingling in your fingers. Treating  your  pain  at  the  first  sign  of  the  block  wearing  off  will  ensure  your  pain  is  better  controlled  and  more  tolerable  when  full-sensation  returns. Do  not  wait  until  the  pain  is  intolerable,  as  the  medicine  will  be  less  effective. It  is  better  to  treat  pain  in  advance  than  to  try  and  catch  up. General  Anesthesia or Sedation:      If  you  did  not  receive  a  nerve  block  during  your  surgery,  you  will  need  to  start  taking  your  pain  medication  shortly  after  your  surgery  and  should  continue  to  do  so  as  prescribed  by  your  surgeon. Please  call  [phone]  with any concern and ask to speak with Tommy Jean. Concerning problems include:      -  Excessive  redness  of  the  incisions      -  Drainage  for  more  than  2  Days after surgery or any foul smelling drainage  -  Fever  of  more  than  101.5  F      Please  call  [phone]  if  you  do  not  receive  or  are  unsure  of  your  first  follow-up  appointment. You  should  see  the  doctor  10-14  days  after  your  Surgery. Thank you for choosing me and [address] [place] [address] for your care.  I will go above and beyond to ensure you receive the best care possible. Marquez Lund MD, PhD    ACTIVITY  · As tolerated and as directed by your doctor. · Bathe or shower as directed by your doctor. DIET  · Clear liquids until no nausea or vomiting; then light diet for the first day. · Advance to regular diet on second day, unless your doctor orders otherwise. · If nausea and vomiting continues, call your doctor. PAIN  · Take pain medication as directed by your doctor. · Call your doctor if pain is NOT relieved by medication. · DO NOT take aspirin of blood thinners unless directed by your doctor. CALL YOUR DOCTOR IF   · Excessive bleeding that does not stop after holding pressure over the area  · Temperature of 101 degrees F or above  · Excessive redness, swelling or bruising, and/ or green or yellow, smelly discharge from incision    AFTER ANESTHESIA   · For the first 24 hours: DO NOT Drive, Drink alcoholic beverages, or Make important decisions. · Be aware of dizziness following anesthesia and while taking pain medication. DISCHARGE SUMMARY from Nurse    PATIENT INSTRUCTIONS:    After general anesthesia or intravenous sedation, for 24 hours or while taking prescription Narcotics:  · Limit your activities  · Do not drive and operate hazardous machinery  · Do not make important personal or business decisions  · Do  not drink alcoholic beverages  · If you have not urinated within 8 hours after discharge, please contact your surgeon on call. *  Please give a list of your current medications to your Primary Care Provider. *  Please update this list whenever your medications are discontinued, doses are      changed, or new medications (including over-the-counter products) are added. *  Please carry medication information at all times in case of emergency situations.       These are general instructions for a healthy lifestyle:    No smoking/ No tobacco products/ Avoid exposure to second hand smoke    Surgeon General's Warning: Quitting smoking now greatly reduces serious risk to your health. Obesity, smoking, and sedentary lifestyle greatly increases your risk for illness    A healthy diet, regular physical exercise & weight monitoring are important for maintaining a healthy lifestyle    You may be retaining fluid if you have a history of heart failure or if you experience any of the following symptoms:  Weight gain of 3 pounds or more overnight or 5 pounds in a week, increased swelling in our hands or feet or shortness of breath while lying flat in bed. Please call your doctor as soon as you notice any of these symptoms; do not wait until your next office visit. Recognize signs and symptoms of STROKE:    F-face looks uneven    A-arms unable to move or move unevenly    S-speech slurred or non-existent    T-time-call 911 as soon as signs and symptoms begin-DO NOT go       Back to bed or wait to see if you get better-TIME IS BRAIN.

## 2019-06-20 NOTE — ANESTHESIA POSTPROCEDURE EVALUATION
Procedure(s):  LEFT INDEX FINGER IRRIGATION AND DEBRIDEMENT LOCAL W/ MAC. MAC    Anesthesia Post Evaluation      Multimodal analgesia: multimodal analgesia used between 6 hours prior to anesthesia start to PACU discharge  Patient location during evaluation: bedside  Patient participation: complete - patient participated  Level of consciousness: awake and responsive to light touch  Pain management: adequate  Airway patency: patent  Anesthetic complications: no  Cardiovascular status: acceptable, hemodynamically stable, blood pressure returned to baseline and stable  Respiratory status: acceptable, unassisted, spontaneous ventilation and nonlabored ventilation  Hydration status: acceptable  Post anesthesia nausea and vomiting:  controlled      Vitals Value Taken Time   /85 6/20/2019  9:01 AM   Temp 36.4 °C (97.5 °F) 6/20/2019  9:01 AM   Pulse 60 6/20/2019  9:05 AM   Resp 14 6/20/2019  9:01 AM   SpO2 93 % 6/20/2019  9:05 AM   Vitals shown include unvalidated device data.

## 2019-06-20 NOTE — OP NOTES
ORTHOPAEDIC SURGICAL NOTE        Kavin De Jesus female 46 y.o.  [id number]   6/20/2019     PRE-OP DIAGNOSIS: Pyogenic arthritis of left hand, due to unspecified organism [facility]) [M00.9]   POST-OP DIAGNOSIS: Pyogenic arthritis of left hand, due to unspecified organism [facility]) [M00.9]   LATERALITY: Left     PROCEDURES PERFORMED:   Arthrotomy of left index DIP joint for irrigation and debridement of pyogenic joint infection, mucous cyst excision,, middle and distal phalanx bedridement, osteophyte excision and partial craterization for osteomyelitis, soft tissue rearrangement local for coverage of joint (26829, 44796, 09922, 25814)       SURGEON:   Twin Bee MD, PhD     IMPLANTS:   * No implants in log *   Procedure(s):  LEFT INDEX FINGER IRRIGATION AND DEBRIDEMENT LOCAL W/ MAC   Surgeon(s):  Leif Dasilva MD   Procedure(s):  LEFT INDEX FINGER IRRIGATION AND DEBRIDEMENT LOCAL W/ MAC     ANESTHESIA: MAC     STAFF:    Circ-1: Alejandro Paul RN  Scrub Tech-1: Renu Mcneill Mode BLOOD LOSS: Minimal       TOTAL IV FLUIDS : See anesthesia note    COMPLICATIONS: None     DRAINS:       TOURNIQUET TIME: * No tourniquets in log *     INDICATION FOR PROCEDURE:     Kavin De Jesus developed left index finger infection after she manipulated and personally incised a mucous cyst.  Surgical and non-surgical treatment options were discussed with the patient and their family, as well as the risk and benefits of each option. After thorough discussion, the patient decided to proceed with surgical management. Specific to this treatment plan, we discussed in detail surgical risks including scar, pain, bleeding, infection, anesthetic risks, neurovascular injury, failure to achieve desired results, hardware problems, need for further surgery,  weakness, stiffness, risk of death and potential risk of other unforseen complication.        The patient consented to the procedure after discussion of the risks and benefits. DESCRIPTION OF PROCEDURE:     The patient was identified in the holding room. The Left hand was marked and confirmed as the correct operative site. They were then brought to the OR and general anesthesia was induced. They were transferred onto the OR table in the supine position. All bony prominences were well padded. SCDs were placed on the bilateral legs throughout the case. A timeout was performed, verifying the correct patient, the correct side being the Left side and the correct procedure. Antibiotics were then administered, and were redosed during the procedure as needed at indicated intervals. A non-sterile tourniquet was placed on the left index finger after local anestheias was obtained with a combination of 10cc 2% Lidocaine plain and 0.25% Bupivacaine. The Left upper extremity was pre scrubbed and then prepped and draped in routine sterile fashion. An incisional timeout was performed re-confirming the correct patient, surgical site and procedure, as well as verifying antibiotics. A transverse incision as made proximal to the draining cyst and over the DIP joint, blunt dissection was carried down to the extensor tendon, some purulence was encountered here and cultures swab was taken, the mucous cyst was excised by dissecting under the distal skin flap and was then sharply excised with a knife. The dissection was then carried down to the DIP joint, there was a good access to the joint over the radial side where the terminal tendon was damaged by the infection, however the ulnar half remained intact. The joint was thoroughly debrided with a combination of saline and bacitracin. Two large osteophytes were excised, one from the middle phalanx and one from the distal phalanx, this was done using Rongeur, the area was then debrided with a curette and thoroughly irrigated again.  There was some missing skin over the drainage are and the joint was easily visualized from there so the incision was extended on the radial side in curvilinear fashion distally and the skin was mobilized in retrograde fashion to cover the defect on the dorsum of the finger. The tourniquet was deflated and hemostasis was ensured. The wounds were then thoroughly irrigated and closed with 5-0 monocryl. A sterile dressing was applied followed by a  compressive dressing     The patient was awakened and taken to PACU in stable condition. All sponge and needle counts were correct at the end of the case. I was present and scrubbed for the entire procedure. DISPOSITION:    The patient will be discharged home.      Weightbearing status: No lifting more than 2 lbs       CHEMICAL VTE (DVT) PROPHYLAXIS: None warranted for this case     FOLLOW-UP:  10-14 days for wound check    Norma Dixon MD    06/20/19  8:56 AM

## 2019-06-20 NOTE — H&P
H&P Update:  Yumiko Handy was seen and examined. History and physical has been reviewed. The patient has been examined.  There have been no significant clinical changes since the completion of the originally dated History and Abby Duque MD, PhD  Orthopaedic Surgery  06/20/19  7:03 AM

## 2019-06-20 NOTE — PERIOP NOTES
Spoke with Dr Manuela De La Rosa about pt's question of antibiotics after surgery, she said she had enough Bactrim through this Sunday, Dr Manuela De La Rosa said he would call in more to her Pharmacy and then let pt know

## 2019-06-23 LAB
BACTERIA SPEC CULT: ABNORMAL
GRAM STN SPEC: ABNORMAL
SERVICE CMNT-IMP: ABNORMAL
SERVICE CMNT-IMP: ABNORMAL

## 2019-06-27 LAB
FUNGUS CULTURE, RFCO2T: POSITIVE
FUNGUS SMEAR, RFCO1T: ABNORMAL
FUNGUS SPEC CULT: NORMAL
FUNGUS STAIN, 188244: NORMAL
REFLEX TO ID, RFCO3T: ABNORMAL
SPECIMEN SOURCE: ABNORMAL
SPECIMEN SOURCE: NORMAL

## 2019-06-28 LAB
BACTERIA SPEC CULT: NORMAL
BACTERIA SPEC CULT: NORMAL
FUNGUS ID 1, (DID), RFIM1T: NORMAL
FUNGUS ID 1, (DID), RFIM1T: NORMAL
SERVICE CMNT-IMP: NORMAL
SERVICE CMNT-IMP: NORMAL
SPECIMEN SOURCE: NORMAL
SPECIMEN SOURCE: NORMAL

## 2019-07-05 LAB
FUNGUS ID 2, RFIM2T: NORMAL
SPECIMEN SOURCE: NORMAL

## 2019-07-18 ENCOUNTER — HOSPITAL ENCOUNTER (OUTPATIENT)
Dept: LAB | Age: 54
Discharge: HOME | End: 2019-07-18
Attending: INTERNAL MEDICINE
Payer: COMMERCIAL

## 2019-07-18 ENCOUNTER — HOSPITAL ENCOUNTER (OUTPATIENT)
Dept: GENERAL RADIOLOGY | Age: 54
Discharge: HOME | End: 2019-07-18
Attending: INTERNAL MEDICINE
Payer: COMMERCIAL

## 2019-07-18 DIAGNOSIS — M00.049: ICD-10-CM

## 2019-07-18 LAB
ALBUMIN SERPL-MCNC: 4.1 G/DL (ref 3.5–5)
ALBUMIN/GLOB SERPL: 1.2 {RATIO} (ref 1.2–3.5)
ALP SERPL-CCNC: 85 U/L (ref 50–136)
ALT SERPL-CCNC: 30 U/L (ref 12–65)
ANION GAP SERPL CALC-SCNC: 4 MMOL/L (ref 7–16)
AST SERPL-CCNC: 30 U/L (ref 15–37)
BASOPHILS # BLD: 0 K/UL (ref 0–0.2)
BASOPHILS NFR BLD: 1 % (ref 0–2)
BILIRUB SERPL-MCNC: 0.3 MG/DL (ref 0.2–1.1)
BUN SERPL-MCNC: 16 MG/DL (ref 6–23)
CALCIUM SERPL-MCNC: 9.5 MG/DL (ref 8.3–10.4)
CHLORIDE SERPL-SCNC: 100 MMOL/L (ref 98–107)
CO2 SERPL-SCNC: 28 MMOL/L (ref 21–32)
CREAT SERPL-MCNC: 0.93 MG/DL (ref 0.6–1)
CRP SERPL-MCNC: <0.3 MG/DL (ref 0–0.9)
DIFFERENTIAL METHOD BLD: ABNORMAL
EOSINOPHIL # BLD: 0.1 K/UL (ref 0–0.8)
EOSINOPHIL NFR BLD: 3 % (ref 0.5–7.8)
ERYTHROCYTE [DISTWIDTH] IN BLOOD BY AUTOMATED COUNT: 12.1 % (ref 11.9–14.6)
ERYTHROCYTE [SEDIMENTATION RATE] IN BLOOD: 17 MM/HR (ref 0–30)
GLOBULIN SER CALC-MCNC: 3.3 G/DL (ref 2.3–3.5)
GLUCOSE SERPL-MCNC: 96 MG/DL (ref 65–100)
HCT VFR BLD AUTO: 40.5 % (ref 35.8–46.3)
HGB BLD-MCNC: 13.3 G/DL (ref 11.7–15.4)
IMM GRANULOCYTES # BLD AUTO: 0 K/UL (ref 0–0.5)
IMM GRANULOCYTES NFR BLD AUTO: 0 % (ref 0–5)
LYMPHOCYTES # BLD: 2.2 K/UL (ref 0.5–4.6)
LYMPHOCYTES NFR BLD: 47 % (ref 13–44)
MCH RBC QN AUTO: 29.2 PG (ref 26.1–32.9)
MCHC RBC AUTO-ENTMCNC: 32.8 G/DL (ref 31.4–35)
MCV RBC AUTO: 89 FL (ref 79.6–97.8)
MONOCYTES # BLD: 0.4 K/UL (ref 0.1–1.3)
MONOCYTES NFR BLD: 9 % (ref 4–12)
NEUTS SEG # BLD: 1.9 K/UL (ref 1.7–8.2)
NEUTS SEG NFR BLD: 41 % (ref 43–78)
NRBC # BLD: 0 K/UL (ref 0–0.2)
PLATELET # BLD AUTO: 222 K/UL (ref 150–450)
PMV BLD AUTO: 9.2 FL (ref 9.4–12.3)
POTASSIUM SERPL-SCNC: 4.2 MMOL/L (ref 3.5–5.1)
PROT SERPL-MCNC: 7.4 G/DL (ref 6.3–8.2)
RBC # BLD AUTO: 4.55 M/UL (ref 4.05–5.2)
SODIUM SERPL-SCNC: 132 MMOL/L (ref 136–145)
WBC # BLD AUTO: 4.7 K/UL (ref 4.3–11.1)

## 2019-07-18 PROCEDURE — 73130 X-RAY EXAM OF HAND: CPT

## 2019-07-18 PROCEDURE — 80053 COMPREHEN METABOLIC PANEL: CPT

## 2019-07-18 PROCEDURE — 36415 COLL VENOUS BLD VENIPUNCTURE: CPT

## 2019-07-18 PROCEDURE — 86140 C-REACTIVE PROTEIN: CPT

## 2019-07-18 PROCEDURE — 85652 RBC SED RATE AUTOMATED: CPT

## 2019-07-18 PROCEDURE — 85025 COMPLETE CBC W/AUTO DIFF WBC: CPT

## 2019-09-04 ENCOUNTER — HOSPITAL ENCOUNTER (OUTPATIENT)
Dept: MAMMOGRAPHY | Age: 54
Discharge: HOME | End: 2019-09-04
Attending: INTERNAL MEDICINE
Payer: COMMERCIAL

## 2019-09-04 DIAGNOSIS — Z12.39 BREAST CANCER SCREENING: ICD-10-CM

## 2019-09-04 PROCEDURE — 77067 SCR MAMMO BI INCL CAD: CPT

## 2020-09-16 ENCOUNTER — HOSPITAL ENCOUNTER (OUTPATIENT)
Dept: MAMMOGRAPHY | Age: 55
Discharge: HOME | End: 2020-09-16
Attending: INTERNAL MEDICINE
Payer: COMMERCIAL

## 2020-09-16 DIAGNOSIS — Z12.39 BREAST CANCER SCREENING: ICD-10-CM

## 2020-09-16 PROCEDURE — 77067 SCR MAMMO BI INCL CAD: CPT

## 2021-08-31 ENCOUNTER — ANESTHESIA EVENT (OUTPATIENT)
Dept: SURGERY | Age: 56
End: 2021-08-31
Payer: COMMERCIAL

## 2021-08-31 NOTE — ANESTHESIA PREPROCEDURE EVALUATION
Relevant Problems   ENDOCRINE   (+) Arthritis       Anesthetic History   No history of anesthetic complications            Review of Systems / Medical History  Patient summary reviewed and pertinent labs reviewed    Pulmonary  Within defined limits                 Neuro/Psych   Within defined limits           Cardiovascular  Within defined limits                Exercise tolerance: >4 METS     GI/Hepatic/Renal  Within defined limits              Endo/Other        Arthritis     Other Findings              Physical Exam    Airway  Mallampati: II  TM Distance: 4 - 6 cm  Neck ROM: normal range of motion   Mouth opening: Normal    Comments: Small chin, small mouth opening Cardiovascular  Regular rate and rhythm,  S1 and S2 normal,  no murmur, click, rub, or gallop  Rhythm: regular  Rate: normal         Dental  No notable dental hx       Pulmonary  Breath sounds clear to auscultation               Abdominal         Other Findings            Anesthetic Plan    ASA: 1  Anesthesia type: general          Induction: Intravenous  Anesthetic plan and risks discussed with: Patient and Spouse

## 2021-09-01 ENCOUNTER — ANESTHESIA (OUTPATIENT)
Dept: SURGERY | Age: 56
End: 2021-09-01
Payer: COMMERCIAL

## 2021-09-01 ENCOUNTER — HOSPITAL ENCOUNTER (OUTPATIENT)
Age: 56
Setting detail: OUTPATIENT SURGERY
Discharge: HOME | End: 2021-09-01
Attending: ORTHOPAEDIC SURGERY | Admitting: ORTHOPAEDIC SURGERY
Payer: COMMERCIAL

## 2021-09-01 VITALS
HEART RATE: 70 BPM | DIASTOLIC BLOOD PRESSURE: 69 MMHG | RESPIRATION RATE: 15 BRPM | BODY MASS INDEX: 29.82 KG/M2 | HEIGHT: 67 IN | WEIGHT: 190 LBS | SYSTOLIC BLOOD PRESSURE: 138 MMHG | TEMPERATURE: 98.7 F | OXYGEN SATURATION: 98 %

## 2021-09-01 PROBLEM — S83.242A TEAR OF MEDIAL MENISCUS OF LEFT KNEE, CURRENT: Status: ACTIVE | Noted: 2021-09-01

## 2021-09-01 PROCEDURE — 77030019605: Performed by: ORTHOPAEDIC SURGERY

## 2021-09-01 PROCEDURE — 77030038966 HC KT PLATELET BLD PRP ARTH -D: Performed by: ORTHOPAEDIC SURGERY

## 2021-09-01 PROCEDURE — 77030020274 HC MISC IMPL ORTHOPEDIC: Performed by: ORTHOPAEDIC SURGERY

## 2021-09-01 PROCEDURE — 74011250636 HC RX REV CODE- 250/636: Performed by: NURSE ANESTHETIST, CERTIFIED REGISTERED

## 2021-09-01 PROCEDURE — 74011000250 HC RX REV CODE- 250: Performed by: NURSE ANESTHETIST, CERTIFIED REGISTERED

## 2021-09-01 PROCEDURE — 74011250636 HC RX REV CODE- 250/636: Performed by: ANESTHESIOLOGY

## 2021-09-01 PROCEDURE — C1713 ANCHOR/SCREW BN/BN,TIS/BN: HCPCS | Performed by: ORTHOPAEDIC SURGERY

## 2021-09-01 PROCEDURE — 76210000021 HC REC RM PH II 0.5 TO 1 HR: Performed by: ORTHOPAEDIC SURGERY

## 2021-09-01 PROCEDURE — 29882 ARTHRS KNE SRG MNISC RPR M/L: CPT | Performed by: ORTHOPAEDIC SURGERY

## 2021-09-01 PROCEDURE — 77030006668 HC BLD SHV MENSCS STRY -B: Performed by: ORTHOPAEDIC SURGERY

## 2021-09-01 PROCEDURE — 77030002933 HC SUT MCRYL J&J -A: Performed by: ORTHOPAEDIC SURGERY

## 2021-09-01 PROCEDURE — 77030010509 HC AIRWY LMA MSK TELE -A: Performed by: ANESTHESIOLOGY

## 2021-09-01 PROCEDURE — 76060000033 HC ANESTHESIA 1 TO 1.5 HR: Performed by: ORTHOPAEDIC SURGERY

## 2021-09-01 PROCEDURE — 0232T NJX PLATELET PLASMA: CPT | Performed by: ORTHOPAEDIC SURGERY

## 2021-09-01 PROCEDURE — 2709999900 HC NON-CHARGEABLE SUPPLY: Performed by: ORTHOPAEDIC SURGERY

## 2021-09-01 PROCEDURE — 76010000149 HC OR TIME 1 TO 1.5 HR: Performed by: ORTHOPAEDIC SURGERY

## 2021-09-01 PROCEDURE — 76210000063 HC OR PH I REC FIRST 0.5 HR: Performed by: ORTHOPAEDIC SURGERY

## 2021-09-01 PROCEDURE — 77030038825 HC PUSH KNOT DISP CERT -C: Performed by: ORTHOPAEDIC SURGERY

## 2021-09-01 PROCEDURE — 74011250637 HC RX REV CODE- 250/637: Performed by: ANESTHESIOLOGY

## 2021-09-01 PROCEDURE — 77030003666 HC NDL SPINAL BD -A: Performed by: ORTHOPAEDIC SURGERY

## 2021-09-01 PROCEDURE — 77030040936 HC WND COBLATN S&N -C: Performed by: ORTHOPAEDIC SURGERY

## 2021-09-01 PROCEDURE — 74011250636 HC RX REV CODE- 250/636: Performed by: ORTHOPAEDIC SURGERY

## 2021-09-01 RX ORDER — ONDANSETRON 2 MG/ML
INJECTION INTRAMUSCULAR; INTRAVENOUS AS NEEDED
Status: DISCONTINUED | OUTPATIENT
Start: 2021-09-01 | End: 2021-09-01 | Stop reason: HOSPADM

## 2021-09-01 RX ORDER — SODIUM CHLORIDE 0.9 % (FLUSH) 0.9 %
5-40 SYRINGE (ML) INJECTION AS NEEDED
Status: DISCONTINUED | OUTPATIENT
Start: 2021-09-01 | End: 2021-09-01 | Stop reason: HOSPADM

## 2021-09-01 RX ORDER — HYDROCODONE BITARTRATE AND ACETAMINOPHEN 5; 325 MG/1; MG/1
2 TABLET ORAL AS NEEDED
Status: DISCONTINUED | OUTPATIENT
Start: 2021-09-01 | End: 2021-09-01 | Stop reason: HOSPADM

## 2021-09-01 RX ORDER — HYDROMORPHONE HYDROCHLORIDE 2 MG/ML
0.5 INJECTION, SOLUTION INTRAMUSCULAR; INTRAVENOUS; SUBCUTANEOUS
Status: DISCONTINUED | OUTPATIENT
Start: 2021-09-01 | End: 2021-09-01 | Stop reason: HOSPADM

## 2021-09-01 RX ORDER — SODIUM CHLORIDE, SODIUM LACTATE, POTASSIUM CHLORIDE, CALCIUM CHLORIDE 600; 310; 30; 20 MG/100ML; MG/100ML; MG/100ML; MG/100ML
75 INJECTION, SOLUTION INTRAVENOUS CONTINUOUS
Status: DISCONTINUED | OUTPATIENT
Start: 2021-09-01 | End: 2021-09-01 | Stop reason: HOSPADM

## 2021-09-01 RX ORDER — FENTANYL CITRATE 50 UG/ML
100 INJECTION, SOLUTION INTRAMUSCULAR; INTRAVENOUS ONCE
Status: DISCONTINUED | OUTPATIENT
Start: 2021-09-01 | End: 2021-09-01 | Stop reason: HOSPADM

## 2021-09-01 RX ORDER — SODIUM CHLORIDE 0.9 % (FLUSH) 0.9 %
5-40 SYRINGE (ML) INJECTION EVERY 8 HOURS
Status: DISCONTINUED | OUTPATIENT
Start: 2021-09-01 | End: 2021-09-01 | Stop reason: HOSPADM

## 2021-09-01 RX ORDER — OXYCODONE HYDROCHLORIDE 5 MG/1
5 TABLET ORAL
Status: COMPLETED | OUTPATIENT
Start: 2021-09-01 | End: 2021-09-01

## 2021-09-01 RX ORDER — CEFAZOLIN SODIUM/WATER 2 G/20 ML
2 SYRINGE (ML) INTRAVENOUS ONCE
Status: DISCONTINUED | OUTPATIENT
Start: 2021-09-01 | End: 2021-09-01 | Stop reason: HOSPADM

## 2021-09-01 RX ORDER — FENTANYL CITRATE 50 UG/ML
INJECTION, SOLUTION INTRAMUSCULAR; INTRAVENOUS AS NEEDED
Status: DISCONTINUED | OUTPATIENT
Start: 2021-09-01 | End: 2021-09-01 | Stop reason: HOSPADM

## 2021-09-01 RX ORDER — DEXAMETHASONE SODIUM PHOSPHATE 4 MG/ML
INJECTION, SOLUTION INTRA-ARTICULAR; INTRALESIONAL; INTRAMUSCULAR; INTRAVENOUS; SOFT TISSUE AS NEEDED
Status: DISCONTINUED | OUTPATIENT
Start: 2021-09-01 | End: 2021-09-01 | Stop reason: HOSPADM

## 2021-09-01 RX ORDER — LIDOCAINE HYDROCHLORIDE 10 MG/ML
0.1 INJECTION INFILTRATION; PERINEURAL AS NEEDED
Status: DISCONTINUED | OUTPATIENT
Start: 2021-09-01 | End: 2021-09-01 | Stop reason: HOSPADM

## 2021-09-01 RX ORDER — MIDAZOLAM HYDROCHLORIDE 1 MG/ML
2 INJECTION, SOLUTION INTRAMUSCULAR; INTRAVENOUS
Status: DISCONTINUED | OUTPATIENT
Start: 2021-09-01 | End: 2021-09-01 | Stop reason: HOSPADM

## 2021-09-01 RX ORDER — PROPOFOL 10 MG/ML
INJECTION, EMULSION INTRAVENOUS AS NEEDED
Status: DISCONTINUED | OUTPATIENT
Start: 2021-09-01 | End: 2021-09-01 | Stop reason: HOSPADM

## 2021-09-01 RX ORDER — LIDOCAINE HYDROCHLORIDE 20 MG/ML
INJECTION, SOLUTION EPIDURAL; INFILTRATION; INTRACAUDAL; PERINEURAL AS NEEDED
Status: DISCONTINUED | OUTPATIENT
Start: 2021-09-01 | End: 2021-09-01 | Stop reason: HOSPADM

## 2021-09-01 RX ORDER — ROPIVACAINE HYDROCHLORIDE 5 MG/ML
INJECTION, SOLUTION EPIDURAL; INFILTRATION; PERINEURAL AS NEEDED
Status: DISCONTINUED | OUTPATIENT
Start: 2021-09-01 | End: 2021-09-01 | Stop reason: HOSPADM

## 2021-09-01 RX ADMIN — DEXAMETHASONE SODIUM PHOSPHATE 10 MG: 4 INJECTION, SOLUTION INTRAMUSCULAR; INTRAVENOUS at 07:21

## 2021-09-01 RX ADMIN — ONDANSETRON 4 MG: 2 INJECTION INTRAMUSCULAR; INTRAVENOUS at 07:21

## 2021-09-01 RX ADMIN — PROPOFOL 200 MG: 10 INJECTION, EMULSION INTRAVENOUS at 07:14

## 2021-09-01 RX ADMIN — TRANEXAMIC ACID 1 G: 100 INJECTION, SOLUTION INTRAVENOUS at 07:26

## 2021-09-01 RX ADMIN — FENTANYL CITRATE 50 MCG: 50 INJECTION INTRAMUSCULAR; INTRAVENOUS at 07:18

## 2021-09-01 RX ADMIN — OXYCODONE 5 MG: 5 TABLET ORAL at 08:35

## 2021-09-01 RX ADMIN — LIDOCAINE HYDROCHLORIDE 100 MG: 20 INJECTION, SOLUTION EPIDURAL; INFILTRATION; INTRACAUDAL; PERINEURAL at 07:14

## 2021-09-01 RX ADMIN — SODIUM CHLORIDE, SODIUM LACTATE, POTASSIUM CHLORIDE, AND CALCIUM CHLORIDE 75 ML/HR: 600; 310; 30; 20 INJECTION, SOLUTION INTRAVENOUS at 06:00

## 2021-09-01 RX ADMIN — FENTANYL CITRATE 50 MCG: 50 INJECTION INTRAMUSCULAR; INTRAVENOUS at 07:22

## 2021-09-01 RX ADMIN — FENTANYL CITRATE 50 MCG: 50 INJECTION INTRAMUSCULAR; INTRAVENOUS at 08:07

## 2021-09-01 NOTE — BRIEF OP NOTE
Brief Postoperative Note    Patient: Ame Castano  YOB: 1965  MRN: [id number]    Date of Procedure: 9/1/2021     Pre-Op Diagnosis: Tear of medial meniscus of left knee, current, unspecified tear type, subsequent encounter [S83.242D]  Left knee pain, unspecified chronicity [M25.562]    Post-Op Diagnosis: Same as preoperative diagnosis. Procedure(s):  LEFT KNEE ARTHROSCOPY MEDICAL MEDIAL MENISCUS REPAIR / PRP INJECTION    Surgeon(s):  Aurelia Tang MD    Surgical Assistant: None    Anesthesia: General     Estimated Blood Loss (mL): Minimal    Complications: None    Specimens: * No specimens in log *     Implants:   Implant Name Type Inv.  Item Serial No.  Lot No. LRB No. Used Action   NovoStitch Meniscal Repair Cartridge, size 2.0    [employer] [id number] Left 1 Implanted   NovoStitch Pro Meniscal Repair System size 2.0    [employer] [id number] Left 1 Implanted       Drains: * No LDAs found *    Findings: see op note    Electronically Signed by Obed Barragan MD on 9/1/2021 at 8:10 AM

## 2021-09-01 NOTE — PERIOP NOTES
PACU DISCHARGE NOTE  Vital signs stable, pain well controlled, alert and oriented times three or at baseline, no anesthetic complications. IV removed with catheter tip intact. Written and verbal discharge instructions given, including pain control and follow up appointment. Spouse, Khushboo Ward verbalized understanding and signed discharge instructions. Patient reports she has crutches at home and is knowledgeable about thier use. All questions answered prior to discharge. Dr Ysabel Grove okay to discharge at this time. Pt and all belongings taken via wheelchair and safely put in vehicle.

## 2021-09-01 NOTE — PERIOP NOTES
Debrief completed yes    Correct procedure yes    Count completed and verified yes    Specimen collected and verified n/a    Wound classification clean    Other n/a

## 2021-09-01 NOTE — H&P
Outpatient Surgery History and Physical:  Osmin Anderson was seen and examined. CHIEF COMPLAINT:   Left knee pain. PE:     Visit Vitals  BP (!) 143/96 (BP 1 Location: Right arm, BP Patient Position: Sitting)   Pulse 75   Temp 97.9 °F (36.6 °C)   Resp 18   Ht 5' 7\" (1.702 m)   Wt 190 lb (86.2 kg)   LMP 04/15/2011   SpO2 98%   BMI 29.76 kg/m²       Heart:   Regular rhythm, regular pulses. Lungs:  Are clear, non-labored respirations. Past Medical History:    Patient Active Problem List    Diagnosis    Epigastric pain    CRP elevated    Diverticulitis of jejunum    Acute gastritis without hemorrhage    Arthritis     Hands, knees, ankles, feet      OA (osteoarthritis)       Surgical History:   Past Surgical History:   Procedure Laterality Date    HX CHOLECYSTECTOMY      HX DILATION AND CURETTAGE  11/2010    HX HYSTERECTOMY  5/2011    No BSO    HX OTHER SURGICAL Right 2019    I&D for septic joint right index finger       Social History: Patient  reports that she has never smoked. She has never used smokeless tobacco. She reports current alcohol use of about 1.0 - 2.0 standard drinks of alcohol per week. She reports that she does not use drugs. Family History:   Family History   Problem Relation Age of Onset    Cancer Maternal Grandmother         Colon    Cancer Paternal Grandmother         Breast    Breast Cancer Paternal Grandmother 61    Stroke Paternal Grandfather     Hypertension Mother     Anemia Mother     Hypertension Father     High Cholesterol Father     Coronary Artery Disease Brother     Heart Disease Brother     Heart Attack Brother         MI at 28       Allergies: Reviewed per EMR  Allergies   Allergen Reactions    Mobic [Meloxicam] Other (comments)     Chest pain       Medications:    No current facility-administered medications on file prior to encounter.      Current Outpatient Medications on File Prior to Encounter   Medication Sig    celecoxib (CELEBREX) 100 mg capsule Take 1 Cap by mouth daily.  loratadine (CLARITIN) 10 mg tablet Take 10 mg by mouth daily. Instructed to take DOS per anesthesia guidelines      multivitamin capsule Take 1 Cap by mouth daily. The surgery is planned for the left knee. LEFT KNEE ARTHROSCOPY MEDICAL MENISCECTOMY VS MEDIAL MENISCUS REPAIR / PRP INJECTION     History and physical has been reviewed. The patient has been examined. There have been no significant clinical changes since the completion of the originally dated History and Physical.  Patient identified by surgeon; surgical site was confirmed by patient and surgeon. The patient is here today for outpatient surgery. I have examined the patient, no changes are noted in the patient's medical status. Necessity for the procedure/care is still present and the history and physical above is current. See the office notes for the full long term history of the problem. Please see the recent office notes for the musculoskeletal examination. We have already discussed the clinical implications of both conservative and operative treatments. They would like to proceed with operative treatment. I talked with them extensively about the risks, benefits, reasonable expectations and expected recovery time including long term need for ambulatory assistance as well as possible complications including but not limited to bleeding, infection, need for hardware removal or exchange, neurovascular injury, stiffness, pain, dislocation, continued problems, DVT, PE, hardware failure, other fracture, need for further surgery, heterotopic ossification, MI and other anesthesia related risks, etc. They have exhausted all other options and wish to proceed. The patient was counseled at length about the risks of cyndi Covid-19 during their perioperative period and any recovery window from their procedure.   The patient was made aware that cyndi Covid-19  may worsen their prognosis for recovering from their procedure and lend to a higher morbidity and/or mortality risk. All material risks, benefits, and reasonable alternatives including postponing the procedure were discussed. The patient does  wish to proceed with the procedure at this time.       Signed By: Jade Ramos MD     September 1, 2021 7:02 AM

## 2021-09-01 NOTE — DISCHARGE INSTRUCTIONS
Postoperative Instructions - Knee Arthroscopy with Root Repair    Please note these are general instructions for postoperative care. Specific instructions may be given regarding the type of surgery that you have undergone. 1. Postoperative dressings may be removed after 2 to 3 days. Dressings should be kept dry for the first several days. Following removal of the dressing, wounds should be kept dry when showering. A large trash bag taped to the thigh can work well. Do not soak wounds in the tub prior to suture removal.  May re-apply ace wrap or knee sleeve to help control swelling. 2. All steri-strip tapes across the wound should be left in place if your incisions have them. 3. Some discomfort is expected following any operation. You will be given a prescription for pain medication along with instructions for its use. Do not drive while taking pain medications. Do not make important personal or business decisions or sign legal documents the first 24 hours after surgery. If your pain is not adequately controlled, contact your surgeon on call at the numbers on this sheet. 4. Following knee arthroscopy with meniscus repair crutches are used so that you are non weightbearing on your operative leg for 6 weeks. Working on regaining knee motion is encouraged from full extension to about 60 degrees of flexion. Bending and straightening the knee and preforming ankle range of motion is encouraged to prevent blood clots. You will be in a hinged brace that is initially locked in extension to be used all of the time including during sleep. Throughout the day and as needed while walking with your crutches the brace may be unlocked. This will allow protected but not full range of motion. Remember, keeping the leg as straight as possible after this surgery is very important. 5. Elevating the lower extremity after surgery is helpful in the first few days postoperatively. This can help control swelling. Applying ice for 15 to 20 minutes per hour to the surgical site is often helpful in decreasing pain and swelling. 6. As pain subsides, discontinuation of narcotic medication is recommended and switching to Tylenol is desirable. 7. Pain medications can cause constipation, nausea and vomiting, and sometimes itching and hives. Keeping hydrated by taking liquids on a regular basis can help. For constipation, consider over the counter additions like Metamucil or an over-the-counter stool softner. Trying to limit narcotic use can often help with regards to nausea and vomiting. Taking pain medication with a small amount of food is also usually helpful. Itching and hives can occur with pain medication as well as antibiotics that are given during the ana laura-operative time. Over the counter Benadryl (25mg every 6 hours) can be helpful in treating itching. 8. You will have a physical therapy prescription in your chart and this should start immediately within a day of your surgery. Initially your focus should be on controlling pain and swelling and maintaining EXTENSION of your knee. Feel free to call our office to get you into physical therapy if you do not already have a prescription. 9. Any fevers (101º or greater) after 2-3 days post-op, increasing redness, drainage, or steadily increasing pain please notify us or come in as soon as possible. 10. Deep vein thrombosis (DVT) is a condition in which a blood clot has formed in a deep vein of the leg. This may result in redness, swelling, warmth and/or pain of the leg. Although these are very rare, there are things that can be done to lessen the risk. - It is recommended by your surgeon unless indicated otherwise, after knee arthroscopy; take an adult aspirin once a day for three weeks after surgery to help prevent the formation of blood clots. (Do not take aspirin against the advice of your medical doctor).        -     For several days (at least until you are walking about most of the day), rotate your  ankle, bend your toes and move your foot back and forth and from side to side, frequently (at least a few minutes every hour while you are awake) in order to keep blood circulation flowing so that the blood clotting will be less likely to occur. The more circulation, the less chance of blood clotting and a DVT. If you call the office please have us paged instead of leaving a voice mail so that we can immediately take care of your needs. [address] [employer]                 Phone [phone]  [place]                 Fax [phone], [address]      Danny 18 IF     Call your doctor if pain is NOT relieved by medication.   Excessive bleeding that does not stop after holding pressure over the area  · Temperature of 101 degrees F or above  · Excessive redness, swelling or bruising, and/ or green or yellow, smelly discharge from incision      TYPICAL SIDE EFFECTS OF PAIN MEDICATION:     Constipation: Drink lots of fluids. Over the counter stool softener if needed.    Nausea: Take pain medication with food. Call your doctor with persistent nausea. ACTIVITY  · As tolerated and as directed by your doctor. · Bathe or shower as directed by your doctor. DIET  · Day of surgery: Clear liquids until no nausea or vomiting; small portion, light diet [place] foods (ex: baked chicken, plain rice, grits, scrambled eggs, toast). Nothing greasy, fried or spicy today. · Advance to regular diet on second day, unless your doctor orders otherwise. · If nausea and vomiting continues, call your doctor. PAIN  · Take pain medication as directed by your doctor. · DO NOT take aspirin or blood thinners unless directed by your doctor. AFTER ANESTHESIA   · For the first 24 hours: DO NOT Drive, Drink alcoholic beverages, or Make important decisions.    · Be aware of dizziness following anesthesia and while taking pain medication. CONTRACEPTIVES  During your procedure you potentially received medication(s) which may reduce the effectiveness of contraceptives. Please consider other forms of contraception for 1 month following your procedure if you are currently using contraceptives as your primary form of birth control. In addition to this, it is recommended you continue your contraceptive as prescribed, unless otherwise instructed by your physician. PATIENT INSTRUCTIONS:    After general anesthesia or intravenous sedation, for 24 hours or while taking prescription Narcotics:  · Limit your activities  · Do not drive and operate hazardous machinery  · Do not make important personal or business decisions  · Do  not drink alcoholic beverages  · If you have not urinated within 8 hours after discharge, please contact your surgeon on call. *  Please give a list of your current medications to your Primary Care Provider. *  Please update this list whenever your medications are discontinued, doses are      changed, or new medications (including over-the-counter products) are added. *  Please carry medication information at all times in case of emergency situations. Preventing Infection at Home  We care about preventing infection and avoiding the spread of germs - not only when you are in the hospital but also when you return home. When you return home from the hospital, its important to take the following steps to help prevent infection and avoid spreading germs that could infect you and others. Ask everyone in your home to follow these guidelines, too. Clean Your Hands  · Clean your hands whenever your hands are visibly dirty, before you eat, before or after touching your mouth, nose or eyes, and before preparing food. Clean them after contact with body fluids, using the restroom, touching animals or changing diapers.   · When washing hands, wet them with warm water and work up a lather. Rub hands for at least 15 seconds, then rinse them and pat them dry with a clean towel or paper towel. · When using hand sanitizers, it should take about 15 seconds to rub your hands dry. If not, you probably didnt apply enough . Cover Your Sneeze or Cough  Germs are released into the air whenever you sneeze or cough. To prevent the spread of infection:  · Turn away from other people before coughing or sneezing. · Cover your mouth or nose with a tissue when you cough or sneeze. Put the tissue in the trash. · If you dont have a tissue, cough or sneeze into your upper sleeve, not your hands. · Always clean your hands after coughing or sneezing. Care for Wounds  Your skin is your bodys first line of defense against germs, but an open wound leaves an easy way for germs to enter your body. To prevent infection:  · Clean your hands before and after changing wound dressings, and wear gloves to change dressings if recommended by your doctor. · Take special care with IV lines or other devices inserted into the body. If you must touch them, clean your hands first.  · Follow any specific instructions from your doctor to care for your wounds. Contact your doctor if you experience any signs of infection, such as fever or increased redness at the surgical or wound site. Keep a Clean Home  · Clean or wipe commonly touched hard surfaces like door handles, sinks, tabletops, phones and TV remotes. · Use products labeled disinfectant to kill harmful bacteria and viruses. · Use a clean cloth or paper towel to clean and dry surfaces. Wiping surfaces with a dirty dishcloth, sponge or towel will only spread germs. · Never share toothbrushes, stanton, drinking glasses, utensils, razor blades, face cloths or bath towels to avoid spreading germs. · Be sure that the linens that you sleep on are clean.   · Keep pets away from wounds and wash your hands after touching pets, their toys or bedding. We care about you and your health. Remember, preventing infections is a team effort between you, your family, friends and health care providers. These are general instructions for a healthy lifestyle:    No smoking/ No tobacco products/ Avoid exposure to second hand smoke  Surgeon General's Warning:  Quitting smoking now greatly reduces serious risk to your health. Obesity, smoking, and sedentary lifestyle greatly increases your risk for illness  A healthy diet, regular physical exercise & weight monitoring are important for maintaining a healthy lifestyle  You may be retaining fluid if you have a history of heart failure or if you experience any of the following symptoms:  Weight gain of 3 pounds or more overnight or 5 pounds in a week, increased swelling in our hands or feet or shortness of breath while lying flat in bed. Please call your doctor as soon as you notice any of these symptoms; do not wait until your next office visit. Recognize signs and symptoms of STROKE:  F-face looks uneven  A-arms unable to move or move unevenly  S-speech slurred or non-existent  T-time-call 911 as soon as signs and symptoms begin-DO NOT go       Back to bed or wait to see if you get better-TIME IS BRAIN. Learning About Coronavirus [phone])  Coronavirus [phone]): Overview  What is coronavirus (COVID-19)? The coronavirus disease (COVID-19) is caused by a virus. It is an illness that was first found in [place], [place], in December 2019. It has since spread worldwide. The virus can cause fever, cough, and trouble breathing. In severe cases, it can cause pneumonia and make it hard to breathe without help. It can cause death. Coronaviruses are a large group of viruses. They cause the common cold. They also cause more serious illnesses like Middle East respiratory syndrome (MERS) and severe acute respiratory syndrome (SARS). COVID-19 is caused by a novel coronavirus.  That means it's a new type that has not been seen in people before. This virus spreads person-to-person through droplets from coughing and sneezing. It can also spread when you are close to someone who is infected. And it can spread when you touch something that has the virus on it, such as a doorknob or a tabletop. What can you do to protect yourself from coronavirus (COVID-19)? The best way to protect yourself from getting sick is to:  · Avoid areas where there is an outbreak. · Avoid contact with people who may be infected. · Wash your hands often with soap or alcohol-based hand sanitizers. · Avoid crowds and try to stay at least 6 feet away from other people. · Wash your hands often, especially after you cough or sneeze. Use soap and water, and scrub for at least 20 seconds. If soap and water aren't available, use an alcohol-based hand . · Avoid touching your mouth, nose, and eyes. What can you do to avoid spreading the virus to others? To help avoid spreading the virus to others:  · Cover your mouth with a tissue when you cough or sneeze. Then throw the tissue in the trash. · Use a disinfectant to clean things that you touch often. · Wear a cloth face cover if you have to go to public areas. · Stay home if you are sick or have been exposed to the virus. Don't go to school, work, or public areas. And don't use public transportation, ride-shares, or taxis unless you have no choice. · If you are sick:  ? Leave your home only if you need to get medical care. But call the doctor's office first so they know you're coming. And wear a face cover. ? Wear the face cover whenever you're around other people. It can help stop the spread of the virus when you cough or sneeze. ? Clean and disinfect your home every day. Use household  and disinfectant wipes or sprays. Take special care to clean things that you grab with your hands.  These include doorknobs, remote controls, phones, and handles on your refrigerator and microwave. And don't forget countertops, tabletops, bathrooms, and computer keyboards. When to call for help  [id number] anytime you think you may need emergency care. For example, call if:  · You have severe trouble breathing. (You can't talk at all.)  · You have constant chest pain or pressure. · You are severely dizzy or lightheaded. · You are confused or can't think clearly. · Your face and lips have a blue color. · You pass out (lose consciousness) or are very hard to wake up. Call your doctor now if you develop symptoms such as:  · Shortness of breath. · Fever. · Cough. If you need to get care, call ahead to the doctor's office for instructions before you go. Make sure you wear a face cover to prevent exposing other people to the virus. Where can you get the latest information? The following health organizations are tracking and studying this virus. Their websites contain the most up-to-date information. Keri [facility] also learn what to do if you think you may have been exposed to the virus. · U.S. Centers for Disease Control and Prevention (CDC): The CDC provides updated news about the disease and travel advice. The website also tells you how to prevent the spread of infection. www.cdc.gov  · World Health Organization [facility]): WHO offers information about the virus outbreaks. WHO also has travel advice. www.who.int  Current as of: May 8, 2020               Content Version: 12.5  © 1150-3841 [employer]. Care instructions adapted under license by [employer] (which disclaims liability or warranty for this information). If you have questions about a medical condition or this instruction, always ask your healthcare professional. [place] [address] any warranty or liability for your use of this information.

## 2021-09-01 NOTE — ANESTHESIA POSTPROCEDURE EVALUATION
Procedure(s):  LEFT KNEE ARTHROSCOPY MEDICAL MEDIAL MENISCUS REPAIR / PRP INJECTION. general    Anesthesia Post Evaluation      Multimodal analgesia: multimodal analgesia used between 6 hours prior to anesthesia start to PACU discharge  Patient location during evaluation: PACU  Patient participation: complete - patient participated  Level of consciousness: awake and alert  Pain score: 0  Pain management: adequate  Airway patency: patent  Anesthetic complications: no  Cardiovascular status: acceptable and hemodynamically stable  Respiratory status: acceptable and spontaneous ventilation  Hydration status: acceptable  Post anesthesia nausea and vomiting:  none  Final Post Anesthesia Temperature Assessment:  Normothermia (36.0-37.5 degrees C)      INITIAL Post-op Vital signs:   Vitals Value Taken Time   /80 09/01/21 0841   Temp 36.9 °C (98.5 °F) 09/01/21 0823   Pulse 77 09/01/21 0842   Resp 17 09/01/21 0837   SpO2 99 % 09/01/21 0842   Vitals shown include unvalidated device data.

## 2021-09-01 NOTE — OP NOTES
Operative Note    9/1/2021     Preoperative diagnosis:  Tear of medial meniscus of left knee, current, unspecified tear type, subsequent encounter [S83.242D]  Left knee pain, unspecified chronicity [M25.562]    Postoperative diagnosis: Tear of medial meniscus of left knee, current, unspecified tear type, subsequent encounter [S83.242D]  Left knee pain, unspecified chronicity [M25.562]    Surgeon(s) and Role:     Vijay Quach MD - Primary     Assistant: none      Anesthesia: General    Tourniquet Time:   Total Tourniquet Time Documented:  Thigh (Left) - 42 minutes  Total: Thigh (Left) - 42 minutes     At 250 mmHg. Antibiotics: Ancef 2 gram IV    Procedures:  Procedure(s):  LEFT KNEE ARTHROSCOPY MEDICAL MEDIAL MENISCUS REPAIR / PRP INJECTION   [id number]    Findings:  1. EUA -   - lachman's and - pivot shift. Stable to varus and valgus. 2. PFJ - Normal appearing trochlea. 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 3. Medial Joint -posterior horn radial meniscus tear, cartilage appeared fairly normal in the tibia which is mild chondromalacia. On the medial most aspect of the medial femoral condyle there was some grade II chondromalacia that was about 1 cm long by about half centimeter wide. There is no diastases of the meniscal tear site. The inner most aspect the meniscus was still intact but the middle and periphery aspect of the meniscus had a radial tear. 4. Lateral joint -the lateral joint carilage appeared fairly normal without significant chondromalacia. The lateral meniscus was stable to probing and showed no tears present. 5. PCL - stable and intact  6. ACL -  stable and intact     Indications / Consent:   this is a patient with symptoms compatible with a medial meniscus tear. After previous discussions and treatments using both conservative and/or non-operative treatment options the patient elected to proceed with surgery due to continued symptoms.   A review of the risks and benefits, including but not limited to infection, stiffness, injury to nerves and vessels, DVT, PE, MI, need for further operations and other anesthesia related risks was performed with the patient. After this review and the review of the likely outcome and potential complications of the procedure, preoperative verbal and written consents were obtained. The operative procedure and postoperative course were discussed with the patient in detail and the extremity was marked by the patient and myself. Procedure:     the patient was given their anesthetic, placed in a supine position, an EUA was performed and noted above. A lateral post was placed adjacent to the operative leg. The contralateral leg was padded appropriately and lay on the operating table. The surgical leg was prepped with ChloraPrep and draped in the standard fashion. Prior to the beginning of the procedure, a time-out was performed for correct surgical site identification as was marked during the pre-operative meeting. This was confirmed using the written consent and history/physical. Time-out for antibiotic dosing, timing and selection was also performed. An esmarch was used to exsanguinate the leg and the tourniquet was inflated to 250 mmhg. An incision was made and the scope was introduced anterolaterally and an anteromedial portal was developed with the use of spinal needle localization. The patellofemoral joint was viewed and the articular surfaces were normal on the trochlea of the patella had some grade II chondromalacia of the lateral facet. The medial and lateral gutters were reviewed and they were normal.  The medial compartment was viewed and the articular surfaces were noted to be only had minimal evidence of chondromalacia. The medialmost femur had grade 1 change in a small longitudinal 5 x 10 millimeters area. The medial meniscus was probed and a radial type tear was present.   The inner most aspect of the meniscus was still intact. The radial tear though extended towards the periphery. There was no evidence of diastases. The notch was viewed and the ACL and PCL were normal.  The lateral compartment was viewed and probed and the articular surface and lateral meniscus was normal.  Attention was turned back to the medial compartment. The medial meniscus was debrided with manual instruments such as the rasp and motorized jennifer until the tissue was felt to be appropriately stimulated. The spinal needle was used to trephinate the posterior aspect as well. .  The posteromedial compartment was viewed and no further abnormalities were noted. Then using the [employer]ix meniscal repair device to medial to lateral stitches were placed both posterior and anterior in the tear. Excellent fixation was felt. The probe was used to stress the repair which stayed intact and looked in good condition. The scope was then placed superiorly. Using cautery the patellar chondromalacia was then stabilized as well as a chondroplasty performed. Any other loose bits of meniscal debris were removed from the joint. Local anesthetic was injected, 15 ml of Naropin, at the portal sites. Using a spinal needle at the area of the repair site platelet rich plasma was injected into the area. A microfracture awl was also used to place several vents along the notch. Steri strips and mastasol were used to close the portals and a sterile Allevyn dressing and an Ace wrap were placed on the knee. The tourniquet was deflated at 42 min. The patient was returned to the recovery room in a satisfactory condition. Post-operative plan: Patient will work on ROM and will be toe-touch weightbearing for balance only on the left leg. They will start PT in the next week. Otherwise the patient may refer to post op instructions for wound care and progression of activities. Will follow up in 1 week for wound check and post op review.  Pain medications have been provided. Estimated Blood Loss:  Minimal    Fluids:    See anesthesia record. Implant:   Implant Name Type Inv.  Item Serial No.  Lot No. LRB No. Used Action   NovoStitch Meniscal Repair Cartridge, size 2.0    [employer] [id number] Left 1 Implanted   NovoStitch Pro Meniscal Repair System size 2.0    [employer] [id number] Left 1 Implanted       Closure: Primary    Complications: None    Signed By: Kenia Jules MD

## 2021-09-03 ENCOUNTER — HOSPITAL ENCOUNTER (OUTPATIENT)
Dept: PHYSICAL THERAPY | Age: 56
Discharge: HOME | End: 2021-09-03
Attending: ORTHOPAEDIC SURGERY
Payer: COMMERCIAL

## 2021-09-03 DIAGNOSIS — S83.242D TEAR OF MEDIAL MENISCUS OF LEFT KNEE, CURRENT, UNSPECIFIED TEAR TYPE, SUBSEQUENT ENCOUNTER: ICD-10-CM

## 2021-09-03 DIAGNOSIS — M25.562 LEFT KNEE PAIN, UNSPECIFIED CHRONICITY: ICD-10-CM

## 2021-09-03 PROCEDURE — 97140 MANUAL THERAPY 1/> REGIONS: CPT

## 2021-09-03 PROCEDURE — 97162 PT EVAL MOD COMPLEX 30 MIN: CPT

## 2021-09-03 PROCEDURE — 97110 THERAPEUTIC EXERCISES: CPT

## 2021-09-03 NOTE — PROGRESS NOTES
Donato Ball Flaspoehler  : 1965  Primary: Carlie Dietrich Ppo  Secondary:  [address] at [place] [address] [facility], [address] [place] [address],[address], [place] [address].  [phone]   [phone]        OUTPATIENT PHYSICAL THERAPY: Daily Treatment Note 9/3/2021  Visit Count:  1    ICD-10: Treatment Diagnosis:   · Pain in left knee (M25.562)  · Stiffness of left knee, not elsewhere classified (M25.662)  Precautions/Allergies:   Mobic [meloxicam]   TREATMENT PLAN:  Effective Dates: 9/3/2021 TO 2021 (90 days). Frequency/Duration: 2 times a week for 90 Day(s)    Pre-treatment Symptoms/Complaints:  9/3/2021: Patient reports she is doing well, just some discomfort from the surgery. Pain: Initial: Pain Intensity 1: 0  Pain Location 1: Knee  Pain Orientation 1: Left  Post Session:  0/10   Medications Last Reviewed:  9/3/2021  Updated Objective Findings:  See evaluation note from today  TREATMENT:     THERAPEUTIC EXERCISE: (20 minutes):  Exercises per grid below to improve mobility, strength, balance and coordination. Required minimal visual, verbal and manual cues to promote proper body alignment, promote proper body posture and promote proper body mechanics. Progressed resistance, range, repetitions and complexity of movement as indicated. Date:  9/3/2021   Activity/Exercise Parameters   LAQ 10 reps  L LE  HEP   Heel slides 10 reps  L LE  HEP   Quad sets 10 reps  L LE  HEP   Supine SLR 10 reps  L LE  HEP     MANUAL THERAPY: (10 minutes): Soft tissue mobilization (posterior knee, hamstring, gastroc) and joint mobilization (Grade II) was utilized and necessary because of the patient's restricted joint motion and restricted motion of soft tissue. MODALITIES: (10 minutes):      *  Cold Pack Therapy in order to provide analgesia and reduce inflammation and edema.       Treatment/Session Summary:    · Response to Treatment:  Patient tolerated assessment without complaints of increased left knee pain.  Patient verbalized and demonstrated understanding of HEP. · Communication/Consultation:  None today  · Equipment provided today:  None today  · Recommendations/Intent for next treatment session: Next visit will focus on improving overall mobility and pain with daily activities.     Total Treatment Billable Duration:  30 minutes + evaluation  PT Patient Time In/Time Out  Time In: 9555  Time Out: ELMO Shankar     Visit # Therapist initials Date Arrived NS/ Cx < 24 hr >24 hr Cx Visit Comments   1 JS 9/3/2021 X   60 Visits Approved                                                                                                                                                              Abbreviations: NS = No Show; CX = cancelled     Future Appointments   Date Time Provider Kelton Orr   9/7/2021  7:30 AM Eyvonne Regina, PT [facility]   9/9/2021  7:30 AM Tad Ke, PTA [facility]   9/13/2021  9:30 AM Tad Ke, PTA [facility]   9/13/2021  1:45 PM Pauline Guzman MD [facility] POA   9/15/2021  9:30 AM Tad Ke, PTA [facility]   9/20/2021  9:30 AM Tad Ke, PTA [facility]   9/24/2021  9:30 AM Eyvonne Regina, PT [facility]   9/29/2021  9:30 AM Eyvonne Regina, PT [facility]   10/1/2021  9:30 AM Eyvonne Regina, PT [facility]   10/6/2021  9:30 AM Eyvonne Regina, PT [facility]   10/8/2021  9:30 AM Alayna Lora, PT [facility]

## 2021-09-03 NOTE — THERAPY EVALUATION
Jesus Cochran : 1965 Primary: [employer] Ppo Secondary:  Therapy Center at [address], [address], [address] [facility] [phone]   [phone] OUTPATIENT PHYSICAL THERAPY:Initial Assessment 9/3/2021 ICD-10: Treatment Diagnosis:  
· Pain in left knee (M25.562) · Stiffness of left knee, not elsewhere classified (M25.662) Precautions/Allergies:  
Mobic [meloxicam] TREATMENT PLAN: 
Effective Dates: 9/3/2021 TO 2021 (90 days). Frequency/Duration: 2 times a week for 90 Day(s) MEDICAL/REFERRING DIAGNOSIS: 
Left knee pain, unspecified chronicity [M25.562] Tear of medial meniscus of left knee, current, unspecified tear type, subsequent encounter Aaron Hay DATE OF ONSET: Chronic REFERRING PHYSICIAN: Jones Kumar MD MD Orders: Evaluate and Treat Return MD Appointment: TBD INITIAL ASSESSMENT:  Ms. Tali Gary presents with decreased mobility, decreased strength, and pain in left knee following recent left meniscal repair. After discussing with patient, she agreed she would benefit from physical therapy to improve above deficits. Please sign this plan of treatment if you concur. Thank you for the opportunity to serve this patient. PROBLEM LIST (Impacting functional limitations): 1. Decreased Strength 2. Decreased ADL/Functional Activities 3. Decreased Ambulation Ability/Technique 4. Decreased Balance 5. Increased Pain 6. Decreased Activity Tolerance INTERVENTIONS PLANNED: (Treatment may consist of any combination of the following) 1. Balance Exercise 2. Cold 3. Electrical Stimulation 4. Gait Training 5. Heat 6. Home Exercise Program (HEP) 7. Manual Therapy 8. Neuromuscular Re-education/Strengthening 9. Range of Motion (ROM) 10. Therapeutic Activites 11. Therapeutic Exercise/Strengthening 12. Transcutaneous Electrical Nerve Stimulation (TENS) GOALS: (Goals have been discussed and agreed upon with patient.) Short-Term Functional Goals: Time Frame: 30 days 1. Patient will be independent with home exercise program without exacerbation of symptoms or cueing needed. 2. Patient will be independent with correct sleeping positions and awareness/avoidance of aggravating positions without cueing needed. Discharge Goals: Time Frame: 90 days 1. Patient will be independent with all ADLs with minimal onset of left knee pain and no deficits with daily tasks. 2. Patient will report no fear avoidance with social or recreational activities due to left knee pain. 3. Patient will score greater than or equal to 60/80 on Lower Extremity Functional Scale with minimal effect of left knee pain on patient's ability to manage every day life activities. OUTCOME MEASURE:  
Tool Used: Lower Extremity Functional Scale (LEFS) Score:  Initial: 12/80 Most Recent: X/80 (Date: -- ) Interpretation of Score: 20 questions each scored on a 5 point scale with 0 representing \"extreme difficulty or unable to perform\" and 4 representing \"no difficulty\". The lower the score, the greater the functional disability. 80/80 represents no disability. Minimal detectable change is 9 points. MEDICAL NECESSITY:  
· Patient is expected to demonstrate progress in strength, range of motion, balance and coordination to improve safety during daily activities. · Patient demonstrates good rehab potential due to higher previous functional level. · Skilled intervention continues to be required due to decreased functional mobility. REASON FOR SERVICES/OTHER COMMENTS: 
· Patient continues to demonstrate capacity to improve overall functional mobility which will increase independence and increase safety. Total Duration: PT Patient Time In/Time Out Time In: 2997 Time Out: 1430 Rehabilitation Potential For Stated Goals: Good Regarding [employer] therapy, I certify that the treatment plan above will be carried out by a therapist or under their direction. Thank you for this referral, 
Laureano Taylor, PT Referring Physician Signature: Andi Franco MD _______________________________ Date _____________ PAIN/SUBJECTIVE:  
Initial: Pain Intensity 1: 0 Pain Location 1: Knee Pain Orientation 1: Left  Post Session:  0/10 HISTORY:  
History of Injury/Illness (Reason for Referral): 
Patient reports her surgery to repair the meniscus on her L LE was on Wednesday (9/1/2021). She reports she is doing really well. She report she has minimal pain in her knee. She reports she has some pressure and soreness from the surgery, but otherwise her knee feels better. She reports that she is doing well with her restrictions with only TTWB and the brace locked at 0 degrees. She reports that she is using the CPM to tolerance right now. She reports she is feeling good overall. She reports she is ready to get moving and feeling better so she can get back to work. Past Medical History/Comorbidities:  
Ms. Chantel Lance  has a past medical history of Arthritis, Asthma, COVID-19 (10/2020), and H/O seasonal allergies. She also has no past medical history of Coagulation defects, COPD, Difficult intubation, Malignant hyperthermia due to anesthesia, Nausea & vomiting, or Pseudocholinesterase deficiency. Ms. Chantel Lance  has a past surgical history that includes hx cholecystectomy; hx dilation and curettage (11/2010); hx hysterectomy (5/2011); and hx other surgical (Right, 2019). Social History/Living Environment:  
Home Environment: Private residence Living Alone: No 
Support Systems: Family member(s), Friends \ neighbors, Spouse/Significant Other/Partner Prior Level of Function/Work/Activity: 
Independent Dominant Side:  
      RIGHT Personal Factors:   
      Sex:  female Age:  61 y.o. Ambulatory/Rehab Services H2 Model Falls Risk Assessment Risk Factors: 
     No Risk Factors Identified Ability to Rise from Chair: 
     (1)  Pushes up, successful in one attempt Falls Prevention Plan: 
     Physical Limitations to Exercise (specify):  TTWB only; 0-90 degrees flexion Total: (5 or greater = High Risk): 1 ©2010 [facility] of [facility] Patent #[id number]. Federal Law prohibits the replication, distribution or use without written permission from [facility] of [employer] Current Medications:   
  
Current Outpatient Medications:  
  aspirin (ASPIRIN) 325 mg tablet, Take 1 Tablet by mouth daily for 7 days. To start after surgery (Patient not taking: Reported on 9/1/2021), Disp: 7 Tablet, Rfl: 0 
  senna-docusate (PERICOLACE) 8.6-50 mg per tablet, Take 1 Tablet by mouth daily. To start after surgery  Indications: constipation (Patient not taking: Reported on 9/1/2021), Disp: 21 Tablet, Rfl: 0 
  ondansetron (ZOFRAN ODT) 4 mg disintegrating tablet, 1 Tablet by SubLINGual route every six (6) hours as needed for Nausea or Nausea or Vomiting. To start after surgery  Indications: prevent nausea and vomiting after surgery (Patient not taking: Reported on 9/1/2021), Disp: 20 Tablet, Rfl: 0 
  oxyCODONE-acetaminophen (PERCOCET 7.5) 7.5-325 mg per tablet, Take 1-2 Tablets by mouth every four to six (4-6) hours as needed for Pain for up to 5 days. Max Daily Amount: 12 Tablets. (Patient not taking: Reported on 9/1/2021), Disp: 40 Tablet, Rfl: 0 
  celecoxib (CELEBREX) 100 mg capsule, Take 1 Cap by mouth daily. , Disp: 90 Cap, Rfl: 3 
  loratadine (CLARITIN) 10 mg tablet, Take 10 mg by mouth daily. Instructed to take DOS per anesthesia guidelines , Disp: , Rfl:  
  multivitamin capsule, Take 1 Cap by mouth daily. , Disp: , Rfl:   
Date Last Reviewed:  9/3/2021 Number of Personal Factors/Comorbidities that affect the Plan of Care: 1-2: MODERATE COMPLEXITY EXAMINATION:  
Observation/Orthostatic Postural Assessment:   
      Posture Assessment: Rounded shoulders, Forward head Palpation:   
      Tenderness to palpation noted along posterior aspect of left knee. Minimal swelling. No bruising noted. ROM:   
      L LE Assessment (AROM): 
 · Hip Flexion: 90 degrees · Hip Extension: 10 degrees · Hip Abduction: 15 degrees · Hip Adduction: 0 degrees · Hip Internal rotation: Not tested · Hip External rotation: Not tested · Knee Flexion: 88 degrees · Knee Extension: 7 degrees Strength:   
      L LE Assessment (Strength): · Hip Flexion: 3/5 with manual muscle testing · Hip Extension: 3/5 with manual muscle testing · Hip Abduction: 3/5 with manual muscle testing · Hip Adduction: 3/5 with manual muscle testing · Hip Internal rotation: Not tested · Hip External rotation: Not tested · Knee Flexion: 2+/5 with manual muscle testing · Knee Extension: 2+/5 with manual muscle testing Special Tests:   
      Not tested at this time Neurological Screen: 
      Dermatomes: Within normal limits Reflexes:  2+ Functional Mobility:  
Gait/Mobility:  
 Base of Support: Center of gravity altered Speed/Charleen: Pace decreased (<100 feet/min) Step Length: Left shortened, Right lengthened Swing Pattern: Left asymmetrical, Right symmetrical 
Gait Abnormalities: Antalgic Ambulation - Level of Assistance: Modified independent Assistive Device: Crutches; Extension brace · Interventions: Verbal cues, Safety awareness training Transfers:   
 Sit to Stand: Modified independent Stand to Sit: Modified independent Stand Pivot Transfers: Modified independent Bed to Chair: Modified independent Lateral Transfers: Modified independent Bed Mobility:   
 Rolling: Modified independent Supine to Sit: Modified independent Sit to Supine: Modified independent Scooting: Modified independent Body Structures Involved: 1. Nerves 2. Muscles 3. Ligaments Body Functions Affected: 1. Sensory/Pain 2. Neuromusculoskeletal 
3.  Movement Related Activities and Participation Affected: 1. Mobility 2. Self Care Number of elements (examined above) that affect the Plan of Care: 4+: HIGH COMPLEXITY CLINICAL PRESENTATION:  
Presentation: Evolving clinical presentation with changing clinical characteristics: MODERATE COMPLEXITY CLINICAL DECISION MAKING:  
Use of outcome tool(s) and clinical judgement create a POC that gives a: Questionable prediction of patient's progress: MODERATE COMPLEXITY

## 2021-09-07 ENCOUNTER — HOSPITAL ENCOUNTER (OUTPATIENT)
Dept: PHYSICAL THERAPY | Age: 56
Discharge: HOME | End: 2021-09-07
Attending: ORTHOPAEDIC SURGERY
Payer: COMMERCIAL

## 2021-09-07 PROCEDURE — 97110 THERAPEUTIC EXERCISES: CPT

## 2021-09-07 NOTE — PROGRESS NOTES
Archie Fuentes  : 1965  Primary: Tiffanie Helms Ppo  Secondary:  [address]  at [address], [address] [place] [address],[address], [place] [address].  [phone]   [phone]        OUTPATIENT PHYSICAL THERAPY: Daily Treatment Note 2021  Visit Count:  2    ICD-10: Treatment Diagnosis:   · Pain in left knee (M25.562)  · Stiffness of left knee, not elsewhere classified (M25.662)  Precautions/Allergies:   Mobic [meloxicam]   TREATMENT PLAN:  Effective Dates: 9/3/2021 TO 2021 (90 days). Frequency/Duration: 2 times a week for 90 Day(s)    Pre-treatment Symptoms/Complaints:  2021: Patient reports she is still doing well with minimal pain. Pain: Initial: Pain Intensity 1: 0  Pain Location 1: Knee  Pain Orientation 1: Left  Post Session:  0/10   Medications Last Reviewed:  2021  Updated Objective Findings:  AROM: 90-2  TREATMENT:     THERAPEUTIC EXERCISE: (20 minutes):  Exercises per grid below to improve mobility, strength, balance and coordination. Required minimal visual, verbal and manual cues to promote proper body alignment, promote proper body posture and promote proper body mechanics. Progressed resistance, range, repetitions and complexity of movement as indicated. Date:  9/3/2021   Activity/Exercise Parameters   Nu-step 5 minutes   Level 1   LAQ 20 reps  L LE   Heel slides 20 reps  L LE   Quad sets 20 reps  L LE   Supine SLR 20 reps  L LE     MANUAL THERAPY: (10 minutes): Soft tissue mobilization (posterior knee, hamstring, gastroc) and joint mobilization (Grade II) was utilized and necessary because of the patient's restricted joint motion and restricted motion of soft tissue. MODALITIES: (0 minutes):      *  Cold Pack Therapy in order to provide analgesia and reduce inflammation and edema. (Ice bag to go).      Treatment/Session Summary:    · Response to Treatment:  Patient tolerated treatment well with improving overall mobility and pain.  · Communication/Consultation:  None today  · Equipment provided today:  None today  · Recommendations/Intent for next treatment session: Next visit will focus on improving overall mobility and pain with daily activities.     Total Treatment Billable Duration:  30 minutes  PT Patient Time In/Time Out  Time In: 0730  Time Out: 0800  Patricia Flores, PT     Visit # Therapist initials Date Arrived NS/ Cx < 24 hr >24 hr Cx Visit Comments   1  9/3/2021 X   60 Visits Approved   2  9/7/2021 X                                                                                                                                                        Abbreviations: NS = No Show; CX = cancelled     Future Appointments   Date Time Provider Kelton Orr   9/7/2021  7:30 AM Monserrat Amezcua, PT [facility]   9/9/2021  7:30 AM Karenasantino Malhotra, PTA [facility]   9/13/2021  9:30 AM Karena Repatito, PTA [facility]   9/13/2021  1:45 PM Rachael Henao MD [facility] POA   9/15/2021  9:30 AM Karena Marya, PTA [facility]   9/20/2021  9:30 AM Karena Repatito, PTA [facility]   9/24/2021  9:30 AM Monserrat Amezcua, PT [facility]   9/29/2021  9:30 AM Monserrat Amezcua, PT [facility]   10/1/2021  9:30 AM Monserrat Amezcua, PT [facility]   10/6/2021  9:30 AM Monserrat Amezcua, PT [facility]   10/8/2021  9:30 AM ADE [place], PT [facility]

## 2021-09-08 NOTE — PROGRESS NOTES
Chang Ro Flaspoedonny  : 1965  Primary: Elis Or Ppo  Secondary:  [address]  at [place] [address] [facility], [address] [place] [address],[address], [address] [facility]  [phone]   [phone]        OUTPATIENT PHYSICAL THERAPY: Daily Treatment Note 2021  Visit Count:  3    ICD-10: Treatment Diagnosis:   · Pain in left knee (M25.562)  · Stiffness of left knee, not elsewhere classified (M25.662)  Precautions/Allergies:   Mobic [meloxicam]   TREATMENT PLAN:  Effective Dates: 9/3/2021 TO 2021 (90 days). Frequency/Duration: 2 times a week for 90 Day(s)    Pre-treatment Symptoms/Complaints:  2021:  \"Doing well\"    Pain: Initial:    Post Session:  0/10   Medications Last Reviewed:  2021  Updated Objective Findings:  AROM: 90-2  TREATMENT:     THERAPEUTIC EXERCISE: (20 minutes):  Exercises per grid below to improve mobility, strength, balance and coordination. Required minimal visual, verbal and manual cues to promote proper body alignment, promote proper body posture and promote proper body mechanics. Progressed resistance, range, repetitions and complexity of movement as indicated. Date:  2021   Activity/Exercise Parameters   Nu-step 8 minutes   Level 1   LAQ 20 reps  L LE   Heel slides 20 reps  L LE   Quad sets 20 reps  L LE   S/L Hip Abduction    Supine SLR 20 reps  L LE     MANUAL THERAPY: (10 minutes): Soft tissue mobilization (posterior knee, hamstring, gastroc) and joint mobilization (Grade II) was utilized and necessary because of the patient's restricted joint motion and restricted motion of soft tissue. MODALITIES: (0 minutes):      *  Cold Pack Therapy in order to provide analgesia and reduce inflammation and edema. (Ice bag to go). Treatment/Session Summary:    · Response to Treatment:  Patient tolerated treatment well with improving overall mobility and pain.   · Communication/Consultation:  None today  · Equipment provided today:  None today  · Recommendations/Intent for next treatment session: Next visit will focus on improving overall mobility and pain with daily activities.     Total Treatment Billable Duration:  30 minutes  PT Patient Time In/Time Out  Time In: 0730  Time Out: 0800  Nabil Navas PTA     Visit # Therapist initials Date Arrived NS/ Cx < 24 hr >24 hr Cx Visit Comments   1 JS 9/3/2021 X   60 Visits Approved   2 JS 9/7/2021 X      3 RK 9/9/21 x                                                                                                                                               Abbreviations: NS = No Show; CX = cancelled     Future Appointments   Date Time Provider Kelton Orr   9/9/2021  7:30 AM Denece Jasmina, PTA [facility]   9/13/2021  9:30 AM Denece Jasmina, PTA [facility]   9/13/2021  1:45 PM Ubaldo Ambrocio MD [facility] POAI POA   9/15/2021  9:30 AM Denece Jasmina, PTA [facility]   9/20/2021  9:30 AM Denece Jasmina, PTA [facility]   9/24/2021  9:30 AM Tomie Lizbet, PT [facility]   9/29/2021  9:30 AM Tomie Davin, PT [facility]   10/1/2021  9:30 AM Tomie Lizbet, PT [facility]   10/6/2021  9:30 AM Tomie Lizbet, PT [facility]   10/8/2021  9:30 AM Tomie Lizbet, PT [facility]

## 2021-09-09 ENCOUNTER — HOSPITAL ENCOUNTER (OUTPATIENT)
Dept: PHYSICAL THERAPY | Age: 56
Discharge: HOME | End: 2021-09-09
Attending: ORTHOPAEDIC SURGERY
Payer: COMMERCIAL

## 2021-09-09 PROCEDURE — 97110 THERAPEUTIC EXERCISES: CPT

## 2021-09-13 ENCOUNTER — HOSPITAL ENCOUNTER (OUTPATIENT)
Dept: PHYSICAL THERAPY | Age: 56
Discharge: HOME | End: 2021-09-13
Attending: ORTHOPAEDIC SURGERY
Payer: COMMERCIAL

## 2021-09-13 PROCEDURE — 97110 THERAPEUTIC EXERCISES: CPT

## 2021-09-13 PROCEDURE — 97140 MANUAL THERAPY 1/> REGIONS: CPT

## 2021-09-13 NOTE — PROGRESS NOTES
Chang Valdovinosaspalva  : 1965  Primary: Elis Or Ppo  Secondary:  [place] at [address], [address] [facility],[address], [facility] [address]  [phone]   [phone]        OUTPATIENT PHYSICAL THERAPY: Daily Treatment Note 2021  Visit Count:  4    ICD-10: Treatment Diagnosis:   · Pain in left knee (M25.562)  · Stiffness of left knee, not elsewhere classified (M25.662)  Precautions/Allergies:   Mobic [meloxicam]   TREATMENT PLAN:  Effective Dates: 9/3/2021 TO 2021 (90 days). Frequency/Duration: 2 times a week for 90 Day(s)    Pre-treatment Symptoms/Complaints:  2021: \"I am doing pretty good\"    Pain: Initial:    Post Session:  0/10   Medications Last Reviewed:  2021  Updated Objective Findings:  AROM: 90-1  TREATMENT:     THERAPEUTIC EXERCISE: (25 minutes):  Exercises per grid below to improve mobility, strength, balance and coordination. Required minimal visual, verbal and manual cues to promote proper body alignment, promote proper body posture and promote proper body mechanics. Progressed resistance, range, repetitions and complexity of movement as indicated. Date:  2021   Activity/Exercise Parameters   Nu-step 8 minutes   Level 1.5   Prone Extension X 20 reps    LAQ  SAQ (white roll) 15 reps  L LE   T-Band Extension Red x 2 sets of 10 reps   Heel slides 20 reps  L LE   Manual Hamstring stretch 3 x 15 sec holds Left   Quad sets 20 reps  L LE   Supine SLR 20 reps  L LE     MANUAL THERAPY: (10 minutes): Soft tissue mobilization (posterior knee, hamstring, gastroc) and joint mobilization (Grade II) was utilized and necessary because of the patient's restricted joint motion and restricted motion of soft tissue. MODALITIES: (0 minutes):      *  Cold Pack Therapy in order to provide analgesia and reduce inflammation and edema. (Ice bag to go).      Treatment/Session Summary:    · Response to Treatment:  Patient tolerated treatment well with improving overall mobility and pain. Patient has MD appointment this afternoon for follow up. · Communication/Consultation:  None today  · Equipment provided today:  None today  · Recommendations/Intent for next treatment session: Next visit will focus on improving overall mobility and pain with daily activities.     Total Treatment Billable Duration:  40 minutes  PT Patient Time In/Time Out  Time In: 0930  Time Out: [address], PTA     Visit # Therapist initials Date Arrived NS/ Cx < 24 hr >24 hr Cx Visit Comments   1  9/3/2021 X   60 Visits Approved   2  9/7/2021 X      3 gonzalo 9-13-21 X                                                                                                                                               Abbreviations: NS = No Show; CX = cancelled     Future Appointments   Date Time Provider Kelton Orr   9/13/2021  9:30 AM Moshe Tejada, PTA [facility]   9/13/2021  1:45 PM Canelo Augustin MD [facility] POAI POA   9/15/2021  9:30 AM Moshe Abbot, PTA [facility]   9/20/2021  9:30 AM Moshe Abbot, PTA [facility]   9/24/2021  9:30 AM Crissie Hacking, PT [facility]   9/29/2021  9:30 AM Crissie Hacking, PT [facility]   10/1/2021  9:30 AM Crissie Hacking, PT [facility]   10/6/2021  9:30 AM Crissie Hacking, PT [facility]   10/8/2021  9:30 AM Crissie Hacking, PT [facility]

## 2021-09-15 ENCOUNTER — HOSPITAL ENCOUNTER (OUTPATIENT)
Dept: PHYSICAL THERAPY | Age: 56
Discharge: HOME | End: 2021-09-15
Attending: ORTHOPAEDIC SURGERY
Payer: COMMERCIAL

## 2021-09-15 PROCEDURE — 97140 MANUAL THERAPY 1/> REGIONS: CPT

## 2021-09-15 PROCEDURE — 97110 THERAPEUTIC EXERCISES: CPT

## 2021-09-15 NOTE — PROGRESS NOTES
Polo Henry Flaspoehler  : 1965  Primary: [facility]  Secondary:  [address] at [place] [address], [place] [address].  [phone]   [phone]        OUTPATIENT PHYSICAL THERAPY: Daily Treatment Note 9/15/2021  Visit Count:  5    ICD-10: Treatment Diagnosis:   · Pain in left knee (M25.562)  · Stiffness of left knee, not elsewhere classified (M25.662)  Precautions/Allergies:   Mobic [meloxicam]   TREATMENT PLAN:  Effective Dates: 9/3/2021 TO 2021 (90 days). Frequency/Duration: 2 times a week for 90 Day(s)    Pre-treatment Symptoms/Complaints:  9/15/2021: \"I am doing pretty good\" \"He said brace at 90 locked TDWB till next MD visit \"    Pain: Initial:    Post Session:  0/10   Medications Last Reviewed:  9/15/2021  Updated Objective Findings:  AROM: 90-1  TREATMENT:     THERAPEUTIC EXERCISE: (25 minutes):  Exercises per grid below to improve mobility, strength, balance and coordination. Required minimal visual, verbal and manual cues to promote proper body alignment, promote proper body posture and promote proper body mechanics. Progressed resistance, range, repetitions and complexity of movement as indicated. Date:  9/15/2021   Activity/Exercise Parameters   Nu-step 8 minutes   Level 1.5   Prone Extension X 20 reps    LAQ  SAQ (white roll) 15 reps  L LE   Standing Hip Abduction- High March -extension-knee flexion // bars Left LE   T-Band Extension Green x 2 sets of 10 reps   Heel slides 20 reps  L LE   Manual Hamstring stretch 3 x 15 sec holds Left   Quad sets 20 reps  L LE   Supine SLR 20 reps  L LE     MANUAL THERAPY: (10 minutes): Soft tissue mobilization (posterior knee, hamstring, gastroc) and joint mobilization (Grade II) was utilized and necessary because of the patient's restricted joint motion and restricted motion of soft tissue.      MODALITIES: (0 minutes):      *  Cold Pack Therapy in order to provide analgesia and reduce inflammation and edema. (Ice bag to go). Treatment/Session Summary:    · Response to Treatment:  Patient tolerated treatment well with improving overall mobility and pain. Patient doing well overall. · Communication/Consultation:  None today  · Equipment provided today:  None today  · Recommendations/Intent for next treatment session: Next visit will focus on improving overall mobility and pain with daily activities.     Total Treatment Billable Duration:  40 minutes  PT Patient Time In/Time Out  Time In: 0930  Time Out: [address], PTA     Visit # Therapist initials Date Arrived NS/ Cx < 24 hr >24 hr Cx Visit Comments   1  9/3/2021 X   60 Visits Approved   2  9/7/2021 X      3 [facility] 9-13-21 X      4 [facility] 9-15-21 x                                                                                                                                      Abbreviations: NS = No Show; CX = cancelled     Future Appointments   Date Time Provider Kelton Orr   9/20/2021  9:30 AM Tad Francis, PTA [facility]   9/24/2021  9:30 AM Eyvonne Regina, PT [facility]   9/29/2021  9:30 AM Eyvonne Regina, PT [facility]   10/1/2021  9:30 AM Eyvonne Regina, PT [facility]   10/6/2021  9:30 AM Eyvonne Regina, PT [facility]   10/8/2021  9:30 AM Eyvonne Regina, PT [facility]   10/14/2021 11:15 AM MD VIVIEN Marie

## 2021-09-20 ENCOUNTER — HOSPITAL ENCOUNTER (OUTPATIENT)
Dept: PHYSICAL THERAPY | Age: 56
Discharge: HOME | End: 2021-09-20
Attending: ORTHOPAEDIC SURGERY
Payer: COMMERCIAL

## 2021-09-20 PROCEDURE — 97140 MANUAL THERAPY 1/> REGIONS: CPT

## 2021-09-20 PROCEDURE — 97110 THERAPEUTIC EXERCISES: CPT

## 2021-09-20 NOTE — PROGRESS NOTES
Reymundo Liu Flaspoehler  : 1965  Primary: Iggy Savage Ppo  Secondary:  [address] [place] [place] [address], [place] [address].  [phone]   [phone]        OUTPATIENT PHYSICAL THERAPY: Daily Treatment Note 2021  Visit Count:  6    ICD-10: Treatment Diagnosis:   · Pain in left knee (M25.562)  · Stiffness of left knee, not elsewhere classified (M25.662)  Precautions/Allergies:   Mobic [meloxicam]   TREATMENT PLAN:  Effective Dates: 9/3/2021 TO 2021 (90 days). Frequency/Duration: 2 times a week for 90 Day(s)    Pre-treatment Symptoms/Complaints:  2021: \"I am doing ok, stiff this morning, but I did get in the CPM this morning\"    Pain: Initial:    Post Session:  0/10   Medications Last Reviewed:  2021  Updated Objective Findings:  100 degrees -1 extension  TREATMENT:     THERAPEUTIC EXERCISE: (25 minutes):  Exercises per grid below to improve mobility, strength, balance and coordination. Required minimal visual, verbal and manual cues to promote proper body alignment, promote proper body posture and promote proper body mechanics. Progressed resistance, range, repetitions and complexity of movement as indicated. Date:  2021   Activity/Exercise Parameters   Nu-step 8 minutes   Level 3.0   Prone Extension X 20 reps #1   LAQ  SAQ (white roll) 15 reps  L LE   Standing Hip Abduction- High March -extension-knee flexion // bars Left LE  X 20 reps   T-Band Extension Green x 2 sets of 10 reps   Heel slides 20 reps  L LE   Manual Hamstring stretch 3 x 15 sec holds Left   Quad sets 20 reps  L LE   Supine SLR  Hip Abduction 20 reps  L LE #1     MANUAL THERAPY: (10 minutes): Soft tissue mobilization (posterior knee, hamstring, gastroc) and joint mobilization (Grade II) was utilized and necessary because of the patient's restricted joint motion and restricted motion of soft tissue.      MODALITIES: (0 minutes):      *  Cold Pack Therapy in order to provide analgesia and reduce inflammation and edema. (Ice bag to go). Treatment/Session Summary:    · Response to Treatment:  Patient tolerated treatment well with improving overall mobility and pain. Patient doing well overall. · Communication/Consultation:  None today  · Equipment provided today:  None today  · Recommendations/Intent for next treatment session: Next visit will focus on improving overall mobility and pain with daily activities.     Total Treatment Billable Duration:  40 minutes  PT Patient Time In/Time Out  Time In: 0930  Time Out: [address], PTA     Visit # Therapist initials Date Arrived NS/ Cx < 24 hr >24 hr Cx Visit Comments   1  9/3/2021 X   60 Visits Approved   2  9/7/2021 X      3 rjk 9-13-21 X      4 rjk 9-15-21 x      5 rjk 9-2--21 x      6 rjk        7 rjk 9-20-21 x                                                                                                           Abbreviations: NS = No Show; CX = cancelled     Future Appointments   Date Time Provider Kelton Dominga   9/20/2021  9:30 AM Sivakumar Arceo PTA [facility]   9/24/2021  9:30 AM Anthonette Callie, PT [facility]   9/29/2021  9:30 AM Anthonette Callie, PT [facility]   10/1/2021  9:30 AM Rodriguezoneluis enrique Ledesma, PT [facility]   10/6/2021  9:30 AM Rodriguezoneluis enrique Ledesma, PT [facility]   10/8/2021  9:30 AM Andi Ledesma, PT [facility]   10/14/2021 11:15 AM MD VIVIEN Tesfaye

## 2021-09-24 ENCOUNTER — HOSPITAL ENCOUNTER (OUTPATIENT)
Dept: PHYSICAL THERAPY | Age: 56
Discharge: HOME | End: 2021-09-24
Attending: ORTHOPAEDIC SURGERY
Payer: COMMERCIAL

## 2021-09-24 PROCEDURE — 97140 MANUAL THERAPY 1/> REGIONS: CPT

## 2021-09-24 PROCEDURE — 97110 THERAPEUTIC EXERCISES: CPT

## 2021-09-24 NOTE — PROGRESS NOTES
Fatuma Valdovinosaspoedonny  : 1965  Primary: John Anis Ppo  Secondary:  [address] at [place] [address] [facility], [address] [place] [address],[address], [facility] [address] [facility].  [phone]   [phone]        OUTPATIENT PHYSICAL THERAPY: Daily Treatment Note 2021  Visit Count:  7    ICD-10: Treatment Diagnosis:   · Pain in left knee (M25.562)  · Stiffness of left knee, not elsewhere classified (M25.662)  Precautions/Allergies:   Mobic [meloxicam]   TREATMENT PLAN:  Effective Dates: 9/3/2021 TO 2021 (90 days). Frequency/Duration: 2 times a week for 90 Day(s)    Pre-treatment Symptoms/Complaints:  2021: Patient reports she is doing well. She reports that doing the CPM first thing in the morning helps to decreae the stiffness. Pain: Initial:    Post Session:  0/10   Medications Last Reviewed:  2021  Updated Objective Findings:  100 degrees -1 extension  TREATMENT:     THERAPEUTIC EXERCISE: (30 minutes):  Exercises per grid below to improve mobility, strength, balance and coordination. Required minimal visual, verbal and manual cues to promote proper body alignment, promote proper body posture and promote proper body mechanics. Progressed resistance, range, repetitions and complexity of movement as indicated. Date:  2021   Activity/Exercise Parameters   Nu-step 8 minutes   Level 3   Prone Extension 20 reps   1 pound   LAQ 15 reps   L LE   SAQ 15 reps   L LE   Standing exercises: hip abduction, high march, hip extension, knee flexion Left LE only  20 reps each   Heel slides 20 reps  L LE   Manual Hamstring stretch 3 reps  15 second hold    Quad sets 20 reps  L LE   Supine SLR 20 reps   1 pound  L LE    Hip Abduction 20 reps   1 pound  L LE      MANUAL THERAPY: (10 minutes):  Soft tissue mobilization (posterior knee, hamstring, gastroc) and joint mobilization (Grade II) was utilized and necessary because of the patient's restricted joint motion and restricted motion of soft tissue. MODALITIES: (0 minutes):      *  Cold Pack Therapy in order to provide analgesia and reduce inflammation and edema. (Ice bag to go). Treatment/Session Summary:    · Response to Treatment:  Patient tolerated treatment well with improving overall mobility noted after treatment. · Communication/Consultation:  None today  · Equipment provided today:  None today  · Recommendations/Intent for next treatment session: Next visit will focus on improving overall mobility and pain with daily activities.     Total Treatment Billable Duration:  40 minutes  PT Patient Time In/Time Out  Time In: 0930  Time Out: [address], PT     Visit # Therapist initials Date Arrived NS/ Cx < 24 hr >24 hr Cx Visit Comments   1  9/3/2021 X   60 Visits Approved   2  9/7/2021 X      3 [facility] 9/8/2021 X       4 [facility] 9-13-21 X      5 [facility] 9-15-21 x      6 [facility] 9-20-21 x      7  9/24/2021 X                                                                                                   Abbreviations: NS = No Show; CX = cancelled     Future Appointments   Date Time Provider Kelton Orr   9/24/2021  9:30 AM Viji Poles, PT [facility]   9/29/2021  9:30 AM Viji Poles, PT [facility]   10/1/2021  9:30 AM Viji Poles, PT [facility]   10/6/2021  9:30 AM Viji Poles, PT [facility]   10/8/2021  9:30 AM Viji Poles, PT [facility]   10/14/2021 11:15 AM MD VIVIEN Martinez

## 2021-09-29 ENCOUNTER — HOSPITAL ENCOUNTER (OUTPATIENT)
Dept: PHYSICAL THERAPY | Age: 56
Discharge: HOME | End: 2021-09-29
Attending: ORTHOPAEDIC SURGERY
Payer: COMMERCIAL

## 2021-09-29 PROCEDURE — 97140 MANUAL THERAPY 1/> REGIONS: CPT

## 2021-09-29 PROCEDURE — 97110 THERAPEUTIC EXERCISES: CPT

## 2021-09-29 NOTE — PROGRESS NOTES
Lucky Baumgarten Flaspoehler  : 1965  Primary: Lulu Galeano Ppo  Secondary:  Therapy Center at [address] [place] [address], [address] [facility] [place] [address], [place] [address].  [phone]   [phone]        OUTPATIENT PHYSICAL THERAPY: Daily Treatment Note 2021  Visit Count:  8    ICD-10: Treatment Diagnosis:   · Pain in left knee (M25.562)  · Stiffness of left knee, not elsewhere classified (M25.662)  Precautions/Allergies:   Mobic [meloxicam]   TREATMENT PLAN:  Effective Dates: 9/3/2021 TO 2021 (90 days). Frequency/Duration: 2 times a week for 90 Day(s)    Pre-treatment Symptoms/Complaints:  2021: Patient reports she is doing well. Pain: Initial:    Post Session:  0/10   Medications Last Reviewed:  2021  Updated Objective Findings:  100 degrees -1 extension  TREATMENT:     THERAPEUTIC EXERCISE: (30 minutes):  Exercises per grid below to improve mobility, strength, balance and coordination. Required minimal visual, verbal and manual cues to promote proper body alignment, promote proper body posture and promote proper body mechanics. Progressed resistance, range, repetitions and complexity of movement as indicated. Date:  2021   Activity/Exercise Parameters   Nu-step 8 minutes   Level 3   Prone Extension 20 reps   1 pound   LAQ 15 reps   L LE   SAQ 15 reps   L LE   Standing exercises: hip abduction, high march, hip extension, knee flexion Left LE only  20 reps each   Heel slides 20 reps  L LE   Manual Hamstring stretch 3 reps  15 second hold    Quad sets 20 reps  L LE   Supine SLR 20 reps   1 pound  L LE    Hip Abduction 20 reps   1 pound  L LE      MANUAL THERAPY: (10 minutes): Soft tissue mobilization (posterior knee, hamstring, gastroc) and joint mobilization (Grade II) was utilized and necessary because of the patient's restricted joint motion and restricted motion of soft tissue.      MODALITIES: (0 minutes):      *  Cold Pack Therapy in order to provide analgesia and reduce inflammation and edema. (Ice bag to go). Treatment/Session Summary:    · Response to Treatment:  Patient tolerated treatment well with improving mobility noted after treatment. · Communication/Consultation:  None today  · Equipment provided today:  None today  · Recommendations/Intent for next treatment session: Next visit will focus on improving overall mobility and pain with daily activities.     Total Treatment Billable Duration:  40 minutes  PT Patient Time In/Time Out  Time In: 0930  Time Out: [address] [facility], PT     Visit # Therapist initials Date Arrived NS/ Cx < 24 hr >24 hr Cx Visit Comments   1  9/3/2021 X   60 Visits Approved   2  9/7/2021 X      3 rj 9/8/2021 X       4 rjk 9-13-21 X      5 rjk 9-15-21 x      6 rjk 9-20-21 x      7  9/24/2021 X       8  9/29/2021 X                                                                                          Abbreviations: NS = No Show; CX = cancelled     Future Appointments   Date Time Provider Kelton Dominga   9/29/2021  9:30 AM Jessica Rosales PT [facility]   10/1/2021  9:30 AM Jessica Rosales PT [facility]   10/6/2021  9:30 AM Jessica Rosales PT [facility]   10/8/2021  9:30 AM Jessica Rosales PT [facility]   10/14/2021 11:15 AM MD VIVIEN Dia

## 2021-10-01 ENCOUNTER — HOSPITAL ENCOUNTER (OUTPATIENT)
Dept: PHYSICAL THERAPY | Age: 56
Discharge: HOME | End: 2021-10-01
Attending: ORTHOPAEDIC SURGERY
Payer: COMMERCIAL

## 2021-10-01 PROCEDURE — 97110 THERAPEUTIC EXERCISES: CPT

## 2021-10-01 PROCEDURE — 97140 MANUAL THERAPY 1/> REGIONS: CPT

## 2021-10-01 NOTE — PROGRESS NOTES
Chance Fuentes  : 1965  Primary: Paulette Jordan Ppo  Secondary:  [address] at [place] [address], [place] [address].  [phone]   [phone]        OUTPATIENT PHYSICAL THERAPY: Daily Treatment Note 10/1/2021  Visit Count:  9    ICD-10: Treatment Diagnosis:   · Pain in left knee (M25.562)  · Stiffness of left knee, not elsewhere classified (M25.662)  Precautions/Allergies:   Mobic [meloxicam]   TREATMENT PLAN:  Effective Dates: 9/3/2021 TO 2021 (90 days). Frequency/Duration: 2 times a week for 90 Day(s) MEDICAL/REFERRING DIAGNOSIS:  Pain in left knee [M25.562]   DATE OF ONSET: Chronic; Surgery: 2021  REFERRING PHYSICIAN: Lu Tijerina MD MD Orders:   0-6 weeks: 1st week: TDWB; After 1st week: Coulter Crew extension(no weight bearing with knee flexed past 90º) with crutches0-90º; Immobilize in full extension at night for 1st week. Heel slides, quad sets, straight leg raises. 6-8 weeks: Wean from crutches. Full ROM. No weight bearing at flexion >90º. Continue previous exercises. Stretching and strengthening. Closed chain exercises (not past 90º). 8-12 weeks: Full weight bearing. Full ROM. Gradually increase to return to full activities at 12 weeks. Return MD Appointment: TBD     Pre-treatment Symptoms/Complaints:  10/1/2021: Patient reports she is doing well today. Pain: Initial:    Post Session:  010   Medications Last Reviewed:  10/1/2021  Updated Objective Findings:  100 degrees -1 extension  TREATMENT:     THERAPEUTIC EXERCISE: (30 minutes):  Exercises per grid below to improve mobility, strength, balance and coordination. Required minimal visual, verbal and manual cues to promote proper body alignment, promote proper body posture and promote proper body mechanics. Progressed resistance, range, repetitions and complexity of movement as indicated.    Date:  10/1/2021   Activity/Exercise Parameters   Nu-step 8 minutes   Level 3   Prone Extension 20 reps   1 pound   LAQ 15 reps   L LE   SAQ 15 reps   L LE   Standing exercises: hip abduction, high march, hip extension, knee flexion Left LE only  20 reps each   Heel slides 20 reps  L LE   Manual Hamstring stretch 3 reps  15 second hold    Quad sets 20 reps  L LE   Supine SLR 20 reps   1 pound  L LE    Hip Abduction 20 reps   1 pound  L LE      MANUAL THERAPY: (10 minutes): Soft tissue mobilization (posterior knee, hamstring, gastroc) and joint mobilization (Grade II) was utilized and necessary because of the patient's restricted joint motion and restricted motion of soft tissue. MODALITIES: (0 minutes):      *  Cold Pack Therapy in order to provide analgesia and reduce inflammation and edema. (Ice bag to go). Treatment/Session Summary:    · Response to Treatment:  Patient tolerated treatment well with improving overall mobility noted. · Communication/Consultation:  None today  · Equipment provided today:  None today  · Recommendations/Intent for next treatment session: Next visit will focus on improving overall mobility and pain with daily activities.     Total Treatment Billable Duration:  40 minutes  PT Patient Time In/Time Out  Time In: 0930  Time Out: [address] [facility], PT     Visit # Therapist initials Date Arrived NS/ Cx < 24 hr >24 hr Cx Visit Comments   1  9/3/2021 X   60 Visits Approved   2  9/7/2021 X      3 [facility] 9/8/2021 X       4 [facility] 9-13-21 X      5 [facility] 9-15-21 x      6 [facility] 9-20-21 x      7  9/24/2021 X       8  9/29/2021 X       9  10/1/2021 X    Progress Note                                                                             Abbreviations: NS = No Show; CX = cancelled     Future Appointments   Date Time Provider Kelton Orr   10/1/2021  9:30 AM Wes Thacker, PT [facility]   10/6/2021  9:30 AM Wes Thacker, PT [facility]   10/8/2021  9:30 AM Wes Thacker, PT [facility]   10/13/2021  9:30 AM Wes Thacker, PT [facility] 10/14/2021 11:15 AM Lupe Neumann MD [facility] PO[facility] POA   10/15/2021  9:30 AM Dameon San Juan, PT [facility]   10/18/2021  9:30 AM Ruthine Ihsan, PTA [facility]   10/20/2021  9:30 AM Ruthine Ihsan, PTA [facility]   10/27/2021  9:30 AM Dameon San Juan, PT [facility]   10/29/2021  9:30 AM Dameon San Juan, PT [facility]   11/3/2021  9:30 AM Dameon San Juan, PT [facility]   11/5/2021  9:30 AM Dameon San Juan, PT [facility]   11/10/2021  9:30 AM Ruthine Ihsan, PTA [facility]   11/12/2021  9:30 AM Talia Armstrong, PT [facility]

## 2021-10-01 NOTE — PROGRESS NOTES
Unk Ege Flaspoehler  : 1965  Primary: Rock Smith Ppo  Secondary:  [address] at [place] [id number] [place] [address],[address], [facility] [address].  [phone]   [phone]          OUTPATIENT PHYSICAL THERAPY:Progress Report 10/1/2021   ICD-10: Treatment Diagnosis:   · Pain in left knee (M25.562)  · Stiffness of left knee, not elsewhere classified (M25.662)  Precautions/Allergies:   Mobic [meloxicam]   TREATMENT PLAN:  Effective Dates: 9/3/2021 TO 2021 (90 days). Frequency/Duration: 2 times a week for 90 Day(s) MEDICAL/REFERRING DIAGNOSIS:  Pain in left knee [M25.562]   DATE OF ONSET: Chronic; Surgery: 2021  REFERRING PHYSICIAN: Luzma Brown MD MD Orders:   0-6 weeks: 1st week: TDWB; After 1st week: Aundria Nava extension(no weight bearing with knee flexed past 90º) with crutches0-90º; Immobilize in full extension at night for 1st week. Heel slides, quad sets, straight leg raises. 6-8 weeks: Wean from crutches. Full ROM. No weight bearing at flexion >90º. Continue previous exercises. Stretching and strengthening. Closed chain exercises (not past 90º). 8-12 weeks: Full weight bearing. Full ROM. Gradually increase to return to full activities at 12 weeks. Return MD Appointment: TBD     ASSESSMENT:  Ms. Moi Larsen presents with improving mobility, strength, and pain in left knee following recent left meniscal repair. Patient has attended a total of 9 scheduled physical therapy visits including initial evaluation on 9/3/2021. Treatment has consisted of mobility and strengthening activities to improve overall safety, mobility, and performance with activities of daily living. Patient is making excellent progress with all aspects of therapy at this time. PROBLEM LIST (Impacting functional limitations):  1. Decreased Strength  2. Decreased ADL/Functional Activities  3. Decreased Ambulation Ability/Technique  4. Decreased Balance  5.  Increased Pain  6. Decreased Activity Tolerance INTERVENTIONS PLANNED: (Treatment may consist of any combination of the following)  1. Balance Exercise  2. Cold  3. Electrical Stimulation  4. Gait Training  5. Heat  6. Home Exercise Program (HEP)  7. Manual Therapy  8. Neuromuscular Re-education/Strengthening  9. Range of Motion (ROM)  10. Therapeutic Activites  11. Therapeutic Exercise/Strengthening  12. Transcutaneous Electrical Nerve Stimulation (TENS)     GOALS: (Goals have been discussed and agreed upon with patient.)  Short-Term Functional Goals: Time Frame: 30 days  1. Patient will be independent with home exercise program without exacerbation of symptoms or cueing needed--gaol met. 2. Patient will be independent with correct sleeping positions and awareness/avoidance of aggravating positions without cueing needed--goal met. Discharge Goals: Time Frame: 90 days  1. Patient will be independent with all ADLs with minimal onset of left knee pain and no deficits with daily tasks--goal ongoing. 2. Patient will report no fear avoidance with social or recreational activities due to left knee pain --goal ongoing. 3. Patient will score greater than or equal to 60/80 on Lower Extremity Functional Scale with minimal effect of left knee pain on patient's ability to manage every day life activities--goal ongoing. OUTCOME MEASURE:   Tool Used: Lower Extremity Functional Scale (LEFS)  Score:  Initial: 12/80 Most Recent: 42/80 (Date: 10/1/2021 )   Interpretation of Score: 20 questions each scored on a 5 point scale with 0 representing \"extreme difficulty or unable to perform\" and 4 representing \"no difficulty\". The lower the score, the greater the functional disability. 80/80 represents no disability. Minimal detectable change is 9 points. MEDICAL NECESSITY:   · Patient is expected to demonstrate progress in strength, range of motion, balance and coordination to improve safety during daily activities.   · Patient demonstrates good rehab potential due to higher previous functional level. · Skilled intervention continues to be required due to decreased functional mobility. REASON FOR SERVICES/OTHER COMMENTS:  · Patient continues to demonstrate capacity to improve overall functional mobility which will increase independence and increase safety. Total Duration:  PT Patient Time In/Time Out  Time In: 0930  Time Out: 1015    Rehabilitation Potential For Stated Goals: Good     PAIN/SUBJECTIVE:   Initial:   0/10 Post Session:  0/10   HISTORY:   History of Injury/Illness (Reason for Referral):  Patient reports her surgery to repair the meniscus on her L LE was on Wednesday (9/1/2021). She reports she is doing really well. She report she has minimal pain in her knee. She reports she has some pressure and soreness from the surgery, but otherwise her knee feels better. She reports that she is doing well with her restrictions with only TTWB and the brace locked at 0 degrees. She reports that she is using the CPM to tolerance right now. She reports she is feeling good overall. She reports she is ready to get moving and feeling better so she can get back to work. 10/1/2021 (Progress Note): Patient reports she is doing well overall with no real pain. She reports she is stiff in the AM and so doing the CPM first thing does help to loosen the knee. She reports she is ready to get back to putting her full weight on her knee again so she can get back to work. Past Medical History/Comorbidities:   Ms. Jazlyn Boyle  has a past medical history of Arthritis, Asthma, COVID-19 (10/2020), and H/O seasonal allergies. She also has no past medical history of Coagulation defects, COPD, Difficult intubation, Malignant hyperthermia due to anesthesia, Nausea & vomiting, or Pseudocholinesterase deficiency.   Ms. Jazlyn oByle  has a past surgical history that includes hx cholecystectomy; hx dilation and curettage (11/2010); hx hysterectomy (5/2011); and hx other surgical (Right, 2019). Social History/Living Environment:   Home Environment: Private residence  Living Alone: No  Support Systems: Family member(s), Friends \ neighbors, Spouse/Significant Other/Partner  Prior Level of Function/Work/Activity:  Independent  Dominant Side:         RIGHT  Personal Factors:          Sex:  female        Age:  61 y.o. Current Medications:       Current Outpatient Medications:     senna-docusate (PERICOLACE) 8.6-50 mg per tablet, Take 1 Tablet by mouth daily. To start after surgery  Indications: constipation (Patient not taking: Reported on 9/1/2021), Disp: 21 Tablet, Rfl: 0    ondansetron (ZOFRAN ODT) 4 mg disintegrating tablet, 1 Tablet by SubLINGual route every six (6) hours as needed for Nausea or Nausea or Vomiting. To start after surgery  Indications: prevent nausea and vomiting after surgery (Patient not taking: Reported on 9/1/2021), Disp: 20 Tablet, Rfl: 0    celecoxib (CELEBREX) 100 mg capsule, Take 1 Cap by mouth daily. , Disp: 90 Cap, Rfl: 3    loratadine (CLARITIN) 10 mg tablet, Take 10 mg by mouth daily. Instructed to take DOS per anesthesia guidelines , Disp: , Rfl:     multivitamin capsule, Take 1 Cap by mouth daily. , Disp: , Rfl:    Date Last Reviewed:  10/1/2021    EXAMINATION:   Observation/Orthostatic Postural Assessment:          Posture Assessment: Rounded shoulders, Forward head   Palpation:          Tenderness to palpation noted along posterior aspect of left knee. Minimal swelling. No bruising noted.     ROM:          L LE Assessment (AROM):   · Hip Flexion: 90 degrees    · Hip Extension: 10 degrees    · Hip Abduction: 15 degrees    · Hip Adduction: 0 degrees    · Hip Internal rotation: Not tested     · Hip External rotation: Not tested    · Knee Flexion: 90 degrees    · Knee Extension: 2 degrees     Strength:          L LE Assessment (Strength):   · Hip Flexion: 3+/5 with manual muscle testing    · Hip Extension: 3+/5 with manual muscle testing    · Hip Abduction: 3+/5 with manual muscle testing    · Hip Adduction: 3+/5 with manual muscle testing    · Knee Flexion: 3+/5 with manual muscle testing    · Knee Extension: 3+/5 with manual muscle testing     Special Tests:          Negative  Neurological Screen:        Dermatomes:   Within normal limits        Reflexes:  2+  Functional Mobility:   Gait/Mobility:    Base of Support: Center of gravity altered  Speed/Charleen: Pace decreased (<100 feet/min)  Step Length: Left shortened, Right lengthened  Swing Pattern: Left asymmetrical, Right symmetrical  Gait Abnormalities: Antalgic  Ambulation - Level of Assistance: Modified independent  Assistive Device: Crutches; Extension brace  · Interventions: Verbal cues, Safety awareness training          Transfers:     Sit to Stand: Independent  Stand to Sit: Independent  Stand Pivot Transfers: Independent  Bed to Chair: Independent  Lateral Transfers: Independent         Bed Mobility:     Rolling: Independent  Supine to Sit: Independent  Sit to Supine: Independent  Scooting: Independent

## 2021-10-06 ENCOUNTER — HOSPITAL ENCOUNTER (OUTPATIENT)
Dept: PHYSICAL THERAPY | Age: 56
Discharge: HOME | End: 2021-10-06
Attending: ORTHOPAEDIC SURGERY
Payer: COMMERCIAL

## 2021-10-06 PROCEDURE — 97110 THERAPEUTIC EXERCISES: CPT

## 2021-10-06 PROCEDURE — 97140 MANUAL THERAPY 1/> REGIONS: CPT

## 2021-10-06 NOTE — PROGRESS NOTES
Deyvi Vallecillo Flaspoehler  : 1965  Primary: Nichola Carrel Ppo  Secondary:  [address] at [place] [id number] [facility], [address] [place] [address],[address], [facility] [address].  [phone]   [phone]        OUTPATIENT PHYSICAL THERAPY: Daily Treatment Note 10/6/2021  Visit Count:  10    ICD-10: Treatment Diagnosis:   · Pain in left knee (M25.562)  · Stiffness of left knee, not elsewhere classified (M25.662)  Precautions/Allergies:   Mobic [meloxicam]   TREATMENT PLAN:  Effective Dates: 9/3/2021 TO 2021 (90 days). Frequency/Duration: 2 times a week for 90 Day(s) MEDICAL/REFERRING DIAGNOSIS:  Pain in left knee [M25.562]   DATE OF ONSET: Chronic; Surgery: 2021  REFERRING PHYSICIAN: Inna Velázquez MD MD Orders:   0-6 weeks: 1st week: TDWB; After 1st week: Devan Alar extension(no weight bearing with knee flexed past 90º) with crutches0-90º; Immobilize in full extension at night for 1st week. Heel slides, quad sets, straight leg raises. 6-8 weeks: Wean from crutches. Full ROM. No weight bearing at flexion >90º. Continue previous exercises. Stretching and strengthening. Closed chain exercises (not past 90º). 8-12 weeks: Full weight bearing. Full ROM. Gradually increase to return to full activities at 12 weeks. Return MD Appointment: DREA     Pre-treatment Symptoms/Complaints:  10/6/2021: Patient reports she is doing well today. Pain: Initial:    Post Session:  0/10   Medications Last Reviewed:  10/6/2021  Updated Objective Findings:  100 degrees -1 extension  TREATMENT:     THERAPEUTIC EXERCISE: (30 minutes):  Exercises per grid below to improve mobility, strength, balance and coordination. Required minimal visual, verbal and manual cues to promote proper body alignment, promote proper body posture and promote proper body mechanics. Progressed resistance, range, repetitions and complexity of movement as indicated.    Date:  10/6/2021   Activity/Exercise Parameters   Nu-step 8 minutes   Level 3   Prone Extension 20 reps   1 pound   LAQ 15 reps   L LE   SAQ 15 reps   L LE   Standing exercises: hip abduction, high march, hip extension, knee flexion Left LE only  20 reps each   Heel slides 20 reps  L LE   Manual Hamstring stretch 3 reps  15 second hold    Quad sets 20 reps  L LE   Supine SLR 20 reps   1 pound  L LE    Hip Abduction 20 reps   1 pound  L LE      MANUAL THERAPY: (10 minutes): Soft tissue mobilization (posterior knee, hamstring, gastroc) and joint mobilization (Grade II) was utilized and necessary because of the patient's restricted joint motion and restricted motion of soft tissue. MODALITIES: (0 minutes):      *  Cold Pack Therapy in order to provide analgesia and reduce inflammation and edema. (Ice bag to go). Treatment/Session Summary:    · Response to Treatment:  Patient tolerated treatment well with improving overall mobility. · Communication/Consultation:  None today  · Equipment provided today:  None today  · Recommendations/Intent for next treatment session: Next visit will focus on improving overall mobility and pain with daily activities.     Total Treatment Billable Duration:  40 minutes  PT Patient Time In/Time Out  Time In: 0930  Time Out: 751 [facility], PT     Visit # Therapist initials Date Arrived NS/ Cx < 24 hr >24 hr Cx Visit Comments   1  9/3/2021 X   60 Visits Approved   2  9/7/2021 X      3 [facility] 9/8/2021 X       4 [facility] 9-13-21 X      5 [facility] 9-15-21 x      6 [facility] 9-20-21 x      7  9/24/2021 X       8  9/29/2021 X       9  10/1/2021 X    Progress Note   10  10/6/2021 X                                                                        Abbreviations: NS = No Show; CX = cancelled     Future Appointments   Date Time Provider Kelton Orr   10/6/2021  9:30 AM Davis Beckford, PT [facility]   10/8/2021  9:30 AM Davis Beckford, PT [facility]   10/13/2021  9:30 AM Davis Beckford, PT [facility]   10/14/2021 11:15 AM Nay Barber MD [facility] POA   10/15/2021  9:30 AM Shefali Baas, PT [facility]   10/18/2021  9:30 AM Ja Sprinkle, PTA [facility]   10/20/2021  9:30 AM Ja Sprinkle, PTA [facility]   10/27/2021  9:30 AM Shefali Baas, PT [facility]   10/29/2021  9:30 AM Shefali Baas, PT [facility]   11/3/2021  9:30 AM Shefali Baas, PT [facility]   11/5/2021  9:30 AM Shefali Baas, PT [facility]   11/10/2021  9:30 AM Ja Sprinkle, PTA [facility]   11/12/2021  9:30 AM Marlon Gamez, Olya Murry, PT [facility]

## 2021-10-08 ENCOUNTER — HOSPITAL ENCOUNTER (OUTPATIENT)
Dept: PHYSICAL THERAPY | Age: 56
Discharge: HOME | End: 2021-10-08
Attending: ORTHOPAEDIC SURGERY
Payer: COMMERCIAL

## 2021-10-08 PROCEDURE — 97110 THERAPEUTIC EXERCISES: CPT

## 2021-10-08 PROCEDURE — 97140 MANUAL THERAPY 1/> REGIONS: CPT

## 2021-10-08 NOTE — PROGRESS NOTES
Caro Fuentes  : 1965  Primary: Gemma Key Ppo  Secondary:  [address] at [address] [place] [address] [facility], [address] [place] [address],[address], [facility] [address].  [phone]   [phone]        OUTPATIENT PHYSICAL THERAPY: Daily Treatment Note 10/8/2021  Visit Count:  11    ICD-10: Treatment Diagnosis:   · Pain in left knee (M25.562)  · Stiffness of left knee, not elsewhere classified (M25.662)  Precautions/Allergies:   Mobic [meloxicam]   TREATMENT PLAN:  Effective Dates: 9/3/2021 TO 2021 (90 days). Frequency/Duration: 2 times a week for 90 Day(s) MEDICAL/REFERRING DIAGNOSIS:  Pain in left knee [M25.562]   DATE OF ONSET: Chronic; Surgery: 2021  REFERRING PHYSICIAN: Sandra Zarate MD MD Orders:   0-6 weeks: 1st week: TDWB; After 1st week: Beverly Whitmore extension(no weight bearing with knee flexed past 90º) with crutches0-90º; Immobilize in full extension at night for 1st week. Heel slides, quad sets, straight leg raises. 6-8 weeks: Wean from crutches. Full ROM. No weight bearing at flexion >90º. Continue previous exercises. Stretching and strengthening. Closed chain exercises (not past 90º). 8-12 weeks: Full weight bearing. Full ROM. Gradually increase to return to full activities at 12 weeks. Return MD Appointment: TBD     Pre-treatment Symptoms/Complaints:  10/8/2021: Patient reports she is ready to be done with her brace. She reports the medial scar still seems to be bound down, but doesn't hurt. She reports an occasional sharp pain in her medial quad that happens at different times, but is usually gone within a few minutes. Pain: Initial:    Post Session:  0/10   Medications Last Reviewed:  10/8/2021  Updated Objective Findings:  100 degrees -1 extension  TREATMENT:     THERAPEUTIC EXERCISE: (30 minutes):  Exercises per grid below to improve mobility, strength, balance and coordination.   Required minimal visual, verbal and manual cues to promote proper body alignment, promote proper body posture and promote proper body mechanics. Progressed resistance, range, repetitions and complexity of movement as indicated. Date:  10/8/2021   Activity/Exercise Parameters   Nu-step 8 minutes   Level 3   Prone Extension 20 reps   1 pound   LAQ 15 reps   L LE   SAQ 15 reps   L LE   Standing exercises: hip abduction, high march, hip extension, knee flexion Left LE only  20 reps each   Heel slides 20 reps  L LE   Manual Hamstring stretch 3 reps  15 second hold    Quad sets 20 reps  L LE   Supine SLR 20 reps   1 pound  L LE    Hip Abduction 20 reps   1 pound  L LE      MANUAL THERAPY: (10 minutes): Soft tissue mobilization (posterior knee, hamstring, gastroc) and joint mobilization (Grade II) was utilized and necessary because of the patient's restricted joint motion and restricted motion of soft tissue. MODALITIES: (0 minutes):      *  Cold Pack Therapy in order to provide analgesia and reduce inflammation and edema. (Ice bag to go). Treatment/Session Summary:    · Response to Treatment:  Patient tolerated treatment well with improving overall mobility. · Communication/Consultation:  None today  · Equipment provided today:  None today  · Recommendations/Intent for next treatment session: Next visit will focus on improving overall mobility and pain with daily activities.     Total Treatment Billable Duration:  40 minutes  PT Patient Time In/Time Out  Time In: 0930  Time Out: 751 [facility], PT     Visit # Therapist initials Date Arrived NS/ Cx < 24 hr >24 hr Cx Visit Comments   1  9/3/2021 X   60 Visits Approved   2  9/7/2021 X      3 gonzalo 9/8/2021 X       4 gonzalo 9-13-21 X      5 gonzalo 9-15-21 x      6 gonzalo 9-20-21 x      7  9/24/2021 X       8  9/29/2021 X       9  10/1/2021 X    Progress Note   10  10/6/2021 X       11  10/8/2021 X                                                               Abbreviations: NS = No Show; CX = cancelled     Future Appointments   Date Time Provider [place]   10/8/2021  9:30 AM Barnet Fells, PT [facility]   10/13/2021  9:30 AM Barnet Fells, PT [facility]   10/14/2021 11:15 AM Inna Velázquez MD [facility] PO PO   10/15/2021  9:30 AM Barnet Fells, PT [facility]   10/18/2021  9:30 AM Melburn Camillus, PTA [facility]   10/20/2021  9:30 AM Melburn [place], PTA [facility]   10/27/2021  9:30 AM Barnet Fells, PT [facility]   10/29/2021  9:30 AM Barnet Fells, PT [facility]   11/3/2021  9:30 AM Barnet Fells, PT [facility]   11/5/2021  9:30 AM Barnet Fells, PT [facility]   11/10/2021  9:30 AM Melburn Camillus, PTA [facility]   11/12/2021  9:30 AM Margy Cao, PT [facility]

## 2021-10-13 ENCOUNTER — HOSPITAL ENCOUNTER (OUTPATIENT)
Dept: PHYSICAL THERAPY | Age: 56
Discharge: HOME | End: 2021-10-13
Attending: ORTHOPAEDIC SURGERY
Payer: COMMERCIAL

## 2021-10-13 PROCEDURE — 97110 THERAPEUTIC EXERCISES: CPT

## 2021-10-13 PROCEDURE — 97140 MANUAL THERAPY 1/> REGIONS: CPT

## 2021-10-13 NOTE — PROGRESS NOTES
Jazmin Guillaume Flaspoehler  : 1965  Primary: Miguel Angel Williamson Ppo  Secondary:  [address] at [facility]  [address] [facility] [address], [address] [place] [address],[address], [address] [facility]  [phone]   [phone]        OUTPATIENT PHYSICAL THERAPY: Daily Treatment Note 10/13/2021  Visit Count:  12    ICD-10: Treatment Diagnosis:   · Pain in left knee (M25.562)  · Stiffness of left knee, not elsewhere classified (M25.662)  Precautions/Allergies:   Mobic [meloxicam]   TREATMENT PLAN:  Effective Dates: 9/3/2021 TO 2021 (90 days). Frequency/Duration: 2 times a week for 90 Day(s) MEDICAL/REFERRING DIAGNOSIS:  Pain in left knee [M25.562]   DATE OF ONSET: Chronic; Surgery: 2021  REFERRING PHYSICIAN: Frank Flanagan MD MD Orders:   0-6 weeks: 1st week: TDWB; After 1st week: Pedro Natalia extension(no weight bearing with knee flexed past 90º) with crutches0-90º; Immobilize in full extension at night for 1st week. Heel slides, quad sets, straight leg raises. 6-8 weeks: Wean from crutches. Full ROM. No weight bearing at flexion >90º. Continue previous exercises. Stretching and strengthening. Closed chain exercises (not past 90º). 8-12 weeks: Full weight bearing. Full ROM. Gradually increase to return to full activities at 12 weeks. Return MD Appointment: DREA     Pre-treatment Symptoms/Complaints:  10/13/2021: Patient reports she is ready to get out of this brace. Pain: Initial:    Post Session:  0/10   Medications Last Reviewed:  10/13/2021  Updated Objective Findings:  100 degrees -1 extension  TREATMENT:     THERAPEUTIC EXERCISE: (30 minutes):  Exercises per grid below to improve mobility, strength, balance and coordination. Required minimal visual, verbal and manual cues to promote proper body alignment, promote proper body posture and promote proper body mechanics. Progressed resistance, range, repetitions and complexity of movement as indicated.    Date:  10/13/2021   Activity/Exercise Parameters   Nu-step PST for LILLIE/AF ablation 8 minutes   Level 3   Prone Extension 20 reps   1 pound   LAQ 15 reps   L LE   SAQ 15 reps   L LE   Standing exercises: hip abduction, high march, hip extension, knee flexion Left LE only  20 reps each   Heel slides 20 reps  L LE   Manual Hamstring stretch 3 reps  15 second hold    Quad sets 20 reps  L LE   Supine SLR 20 reps   1 pound  L LE    Hip Abduction 20 reps   1 pound  L LE      MANUAL THERAPY: (10 minutes): Soft tissue mobilization (posterior knee, hamstring, gastroc) and joint mobilization (Grade II) was utilized and necessary because of the patient's restricted joint motion and restricted motion of soft tissue. MODALITIES: (0 minutes):      *  Cold Pack Therapy in order to provide analgesia and reduce inflammation and edema. (Ice bag to go). Treatment/Session Summary:    · Response to Treatment:  Patient tolerated treatment well with improving overall mobility. · Communication/Consultation:  None today  · Equipment provided today:  None today  · Recommendations/Intent for next treatment session: Next visit will focus on improving overall mobility and pain with daily activities.     Total Treatment Billable Duration:  40 minutes  PT Patient Time In/Time Out  Time In: 0930  Time Out: 751 [facility], PT     Visit # Therapist initials Date Arrived NS/ Cx < 24 hr >24 hr Cx Visit Comments   1  9/3/2021 X   60 Visits Approved   2  9/7/2021 X      3 [facility] 9/8/2021 X       4 [facility] 9-13-21 X      5 [facility] 9-15-21 x      6 [facility] 9-20-21 x      7  9/24/2021 X       8  9/29/2021 X       9  10/1/2021 X    Progress Note   10  10/6/2021 X       11  10/8/2021 X       12  10/13/2021 X                                                      Abbreviations: NS = No Show; CX = cancelled     Future Appointments   Date Time Provider Kelton Orr   10/13/2021  9:30 AM Terri Pina PT [facility] SFE   10/14/2021 11:15 AM Nicholas Lemus MD [facility] PO[facility] POA   10/15/2021  9:30 [facility]   10/18/2021  9:30 AM Felisha Harrison, PTA [facility]   10/20/2021  9:30 AM Felisha Harrison, PTA [facility]   10/27/2021  9:30 AM Retia Lara, PT [facility]   10/29/2021  9:30 AM Retia Lara, PT [facility]   11/3/2021  9:30 AM Retia Lara, PT [facility]   11/5/2021  9:30 AM Retia Lara, PT [facility]   11/10/2021  9:30 AM Felisha Harrison, PTA [facility]   11/12/2021  9:30 AM Donis Amin, PT [facility]

## 2021-10-15 ENCOUNTER — HOSPITAL ENCOUNTER (OUTPATIENT)
Dept: PHYSICAL THERAPY | Age: 56
Discharge: HOME | End: 2021-10-15
Attending: ORTHOPAEDIC SURGERY
Payer: COMMERCIAL

## 2021-10-15 PROCEDURE — 97110 THERAPEUTIC EXERCISES: CPT

## 2021-10-15 PROCEDURE — 97140 MANUAL THERAPY 1/> REGIONS: CPT

## 2021-10-15 NOTE — PROGRESS NOTES
Deyvi Vallecillo Flaspoehler  : 1965  Primary: Nichola Carrel Ppo  Secondary:  [address] at [address] [address] [place] [address],[address], [address] [facility]  [phone]   [phone]        OUTPATIENT PHYSICAL THERAPY: Daily Treatment Note 10/15/2021  Visit Count:  13    ICD-10: Treatment Diagnosis:   · Pain in left knee (M25.562)  · Stiffness of left knee, not elsewhere classified (M25.662)  Precautions/Allergies:   Mobic [meloxicam]   TREATMENT PLAN:  Effective Dates: 9/3/2021 TO 2021 (90 days). Frequency/Duration: 2 times a week for 90 Day(s) MEDICAL/REFERRING DIAGNOSIS:  Pain in left knee [M25.562]   DATE OF ONSET: Chronic; Surgery: 2021  REFERRING PHYSICIAN: Inna Velázquez MD MD Orders:    6-8 weeks: Wean from crutches. Full ROM. No weight bearing at flexion >90º. Continue previous exercises. Stretching and strengthening. Closed chain exercises (not past 90º). 8-12 weeks: Full weight bearing. Full ROM. Gradually increase to return to full activities at 12 weeks. Return MD Appointment: TBD     Pre-treatment Symptoms/Complaints:  10/15/2021: Patient reports she is so excited to be able to get rid of the brace. She reports she is still using her crutches because he wants to her slowly increase her weightbearing status to full. Pain: Initial:    Post Session:  0/10   Medications Last Reviewed:  10/15/2021  Updated Objective Findings:  100 degrees -1 extension  TREATMENT:     THERAPEUTIC EXERCISE: (30 minutes):  Exercises per grid below to improve mobility, strength, balance and coordination. Required minimal visual, verbal and manual cues to promote proper body alignment, promote proper body posture and promote proper body mechanics. Progressed resistance, range, repetitions and complexity of movement as indicated.    Date:  10/15/2021   Activity/Exercise Parameters   Nu-step 8 minutes   Level 3   Walking with crutches WBAT with crutches  Heel/toe gait pattern  200 feet   Standing hip flexion 15 reps  L LE   Standing hip extension 15 reps  L LE   Standing knee flexion 15 reps  L LE   Standing hip abduction 15 reps  L LE   Standing heel/toe raises 15 reps  B LE   TKE Yellow t-band  15 reps  L LE   LAQ 10 reps  B LE   Quad sets 10 reps  L LE  5 reps with overpressure   Heel slides 15 reps  L LE   SAQ 15 reps  B LE   Bridging 10 reps   Straight leg raise 15 reps  L LE     MANUAL THERAPY: (10 minutes): Soft tissue mobilization (posterior knee, hamstring, gastroc) and joint mobilization (Grade II) was utilized and necessary because of the patient's restricted joint motion and restricted motion of soft tissue. Scar tissue mobilization along lateral scar secondary to minimal adhesions still present. MODALITIES: (0 minutes):      *  Cold Pack Therapy in order to provide analgesia and reduce inflammation and edema. (Ice bag to go). Treatment/Session Summary:    · Response to Treatment:  Patient tolerated treatment well with improving overall mobility with new decrease in restrictions. · Communication/Consultation:  None today  · Equipment provided today:  None today  · Recommendations/Intent for next treatment session: Next visit will focus on improving overall mobility and pain with daily activities.     Total Treatment Billable Duration:  40 minutes  PT Patient Time In/Time Out  Time In: 0930  Time Out: [facility], PT     Visit # Therapist initials Date Arrived NS/ Cx < 24 hr >24 hr Cx Visit Comments   1  9/3/2021 X   60 Visits Approved   2  9/7/2021 X      3 monserrat 9/8/2021 X       4 [facility] 9-13-21 X      5 [facility] 9-15-21 x      6 [facility] 9-20-21 x      7  9/24/2021 X       8  9/29/2021 X       9  10/1/2021 X    Progress Note   10  10/6/2021 X       11  10/8/2021 X       12  10/13/2021 X       13  10/15/2021 X                                             Abbreviations: NS = No Show; CX = cancelled     Future Appointments   Date Time Provider Department [place]   10/15/2021  9:30 AM Abdias Barron, PT [facility]   10/18/2021  9:30 AM Grabiel Baron, PTA [facility]   10/20/2021  9:30 AM Grabiel Rosasier, PTA [facility]   10/27/2021  9:30 AM Abdias Barron, PT [facility]   10/29/2021  9:30 AM Abdias Saul, PT [facility]   11/3/2021  9:30 AM Abdias Saul, PT [facility]   11/5/2021  9:30 AM Abdias Barron, PT [facility]   11/10/2021  9:30 AM Grabiel Baron, PTA [facility]   11/12/2021  9:30 AM Abdias Saul, PT [facility]   11/18/2021 11:15 AM MD VIVIEN Resendez

## 2021-10-18 ENCOUNTER — HOSPITAL ENCOUNTER (OUTPATIENT)
Dept: PHYSICAL THERAPY | Age: 56
Discharge: HOME | End: 2021-10-18
Attending: ORTHOPAEDIC SURGERY
Payer: COMMERCIAL

## 2021-10-18 PROCEDURE — 97110 THERAPEUTIC EXERCISES: CPT

## 2021-10-18 PROCEDURE — 97140 MANUAL THERAPY 1/> REGIONS: CPT

## 2021-10-18 NOTE — PROGRESS NOTES
Sun Vadlovinosaspoehlcameron  : 1965  Primary: Tiera Tafoya Ppo  Secondary:  [address] at [place] [address] [facility], [address] [place] [address],[address], [facility] [address]  [phone]   [phone]        OUTPATIENT PHYSICAL THERAPY: Daily Treatment Note 10/18/2021  Visit Count:  14    ICD-10: Treatment Diagnosis:   · Pain in left knee (M25.562)  · Stiffness of left knee, not elsewhere classified (M25.662)  Precautions/Allergies:   Mobic [meloxicam]   TREATMENT PLAN:  Effective Dates: 9/3/2021 TO 2021 (90 days). Frequency/Duration: 2 times a week for 90 Day(s) MEDICAL/REFERRING DIAGNOSIS:  Pain in left knee [M25.562]   DATE OF ONSET: Chronic; Surgery: 2021  REFERRING PHYSICIAN: Chacorta Perez MD MD Orders:    6-8 weeks: Wean from crutches. Full ROM. No weight bearing at flexion >90º. Continue previous exercises. Stretching and strengthening. Closed chain exercises (not past 90º). 8-12 weeks: Full weight bearing. Full ROM. Gradually increase to return to full activities at 12 weeks. Return MD Appointment: TBD     Pre-treatment Symptoms/Complaints:  10/18/2021: \"I am doing good\"  Pain: Initial:    Post Session:  0/10   Medications Last Reviewed:  10/18/2021  Updated Objective Findings:  110 degrees -1 extension  TREATMENT:     THERAPEUTIC EXERCISE: (30 minutes):  Exercises per grid below to improve mobility, strength, balance and coordination. Required minimal visual, verbal and manual cues to promote proper body alignment, promote proper body posture and promote proper body mechanics. Progressed resistance, range, repetitions and complexity of movement as indicated.    Date:  10/18/2021   Activity/Exercise Parameters   Manual Hamstring Stretch with pumps X 20 reps   Nu-step or Bike Seat at #7 8 minutes   Level 3   Walking with crutches WBAT with crutches  Heel/toe gait pattern  200 feet   Standing hip flexion 15 reps  L LE #1   Standing hip extension    Standing knee flexion 15 reps  L LE #1   Standing hip abduction 15 reps  L LE #1   Standing heel/toe raises 15 reps15 reps  L LE #1  B LE   TKE greent-band  15 reps  L LE   LAQ 20 reps  B LE #1   Quad sets 20 reps  L LE  5 reps with overpressure   Heel slides 15 reps  L LE   SAQ 20 reps  B LE #1   Bridging 15 reps   Straight leg raise 20 reps  L LE #1     MANUAL THERAPY: (10 minutes): Soft tissue mobilization (posterior knee, hamstring, gastroc) and joint mobilization (Grade II) was utilized and necessary because of the patient's restricted joint motion and restricted motion of soft tissue. Scar tissue mobilization along lateral scar secondary to minimal adhesions still present. MODALITIES: (0 minutes):      *  Cold Pack Therapy in order to provide analgesia and reduce inflammation and edema. (Ice bag to go). Treatment/Session Summary:    · Response to Treatment:  Patient tolerated treatment well with improving overall mobility with new decrease in restrictions. Patient did well today, added #1 to activities. 7 weeks out from surgery. · Communication/Consultation:  None today  · Equipment provided today:  None today  · Recommendations/Intent for next treatment session: Next visit will focus on improving overall mobility and pain with daily activities.     Total Treatment Billable Duration:  40 minutes  PT Patient Time In/Time Out  Time In: 0930  Time Out: [address], PTA     Visit # Therapist initials Date Arrived NS/ Cx < 24 hr >24 hr Cx Visit Comments   1  9/3/2021 X   60 Visits Approved   2 JS 9/7/2021 X      3 rjk 9/8/2021 X       4 rjk 9-13-21 X      5 rjk 9-15-21 x      6 rjk 9-20-21 x      7 JS 9/24/2021 X       8 Js 9/29/2021 X       9 JS 10/1/2021 X    Progress Note   10 JS 10/6/2021 X       11 JS 10/8/2021 X       12  10/13/2021 X       13  10/15/2021 X       14 rjk 10-18-21 x                                   Abbreviations: NS = No Show; CX = cancelled     Future Appointments   Date Time Provider Department [place]   10/18/2021  9:30 AM Vernida Pond, PTA [facility]   10/20/2021  9:30 AM Vernida Pond, PTA [facility]   10/27/2021  9:30 AM Kaveh Basque, PT [facility]   10/29/2021  9:30 AM Kaveh Basque, PT [facility]   11/3/2021  9:30 AM Nash Basque, PT [facility]   11/5/2021  9:30 AM Nash Basque, PT [facility]   11/10/2021  9:30 AM Vernida Pond, PTA [facility]   11/12/2021  9:30 AM Nash Basque, PT [facility]   11/18/2021 11:15 AM Luzma Brown MD [facility] [place]

## 2021-10-20 ENCOUNTER — HOSPITAL ENCOUNTER (OUTPATIENT)
Dept: PHYSICAL THERAPY | Age: 56
Discharge: HOME | End: 2021-10-20
Attending: ORTHOPAEDIC SURGERY
Payer: COMMERCIAL

## 2021-10-20 PROCEDURE — 97110 THERAPEUTIC EXERCISES: CPT

## 2021-10-20 PROCEDURE — 97140 MANUAL THERAPY 1/> REGIONS: CPT

## 2021-10-20 NOTE — PROGRESS NOTES
Mart Pineda Flaspoe[place]  : 1965  Primary: Nona Morton Ppo  Secondary:  [address] at [place] [address] [facility], [address] [place] [address],[address], [place] [address].  [phone]   [phone]        OUTPATIENT PHYSICAL THERAPY: Daily Treatment Note 10/20/2021  Visit Count:  15    ICD-10: Treatment Diagnosis:   · Pain in left knee (M25.562)  · Stiffness of left knee, not elsewhere classified (M25.662)  Precautions/Allergies:   Mobic [meloxicam]   TREATMENT PLAN:  Effective Dates: 9/3/2021 TO 2021 (90 days). Frequency/Duration: 2 times a week for 90 Day(s) MEDICAL/REFERRING DIAGNOSIS:  Pain in left knee [M25.562]   DATE OF ONSET: Chronic; Surgery: 2021  REFERRING PHYSICIAN: Fernando Yoo MD MD Orders:    6-8 weeks: Wean from crutches. Full ROM. No weight bearing at flexion >90º. Continue previous exercises. Stretching and strengthening. Closed chain exercises (not past 90º). 8-12 weeks: Full weight bearing. Full ROM. Gradually increase to return to full activities at 12 weeks. Return MD Appointment: TBD     Pre-treatment Symptoms/Complaints:  10/20/2021: \"I am doing good, feel like I turned the corner\"  Pain: Initial:    Post Session:  0/10   Medications Last Reviewed:  10/20/2021  Updated Objective Findings:  115 flexion actively - 1 extension  TREATMENT:     THERAPEUTIC EXERCISE: (40 minutes):  Exercises per grid below to improve mobility, strength, balance and coordination. Required minimal visual, verbal and manual cues to promote proper body alignment and promote proper body posture. Progressed resistance, range, repetitions and complexity of movement as indicated.    Date:  10/20/2021   Activity/Exercise Parameters   Manual Hamstring Stretch with pumps X 20 reps   Nu-step or Bike Seat at #6 8 minutes   Seat #6   Walking  x200 feet   Standing hip flexion 15 reps  L LE #1   Standing hip extension 15 reps L LE #1   Standing knee flexion 15 reps  L LE #1   Standing hip abduction 15 reps  L LE #1   Step up 4 in X 15 reps   Standing heel/toe raises 15 reps15 reps  L LE #1  B LE   TKE Blue -band  15 reps  L LE   LAQ 20 reps  B LE #1   Quad sets 20 reps  L LE  5 reps with overpressure   Heel slides 15 reps  L LE   SAQ 20 reps  B LE #1   Bridging 15 reps   Straight leg raise 20 reps  L LE #1     MANUAL THERAPY: (0 minutes): Soft tissue mobilization (posterior knee, hamstring, gastroc) and joint mobilization (Grade II) was utilized and necessary because of the patient's restricted joint motion and restricted motion of soft tissue. Overpressure into extension         Treatment/Session Summary:    · Response to Treatment:  Patient tolerated treatment well with improving overall mobility with new decrease in restrictions. 7 weeks out from surgery-progressing well, patient continues to use CPM at home. Increased ROM today. · Communication/Consultation:  None today  · Equipment provided today:  None today  · Recommendations/Intent for next treatment session: Next visit will focus on improving overall mobility and pain with daily activities.     Total Treatment Billable Duration:  40 minutes  PT Patient Time In/Time Out  Time In: 0930  Time Out: [address], PTA     Visit # Therapist initials Date Arrived NS/ Cx < 24 hr >24 hr Cx Visit Comments   1  9/3/2021 X   60 Visits Approved   2 JS 9/7/2021 X      3 rjk 9/8/2021 X       4 rjk 9-13-21 X      5 rjk 9-15-21 x      6 rjk 9-20-21 x      7  9/24/2021 X       8  9/29/2021 X       9  10/1/2021 X    Progress Note   10  10/6/2021 X       11  10/8/2021 X       12  10/13/2021 X       13 JS 10/15/2021 X       14 rjk 10-18-21 x      15 rjk 10-20-21 x                          Abbreviations: NS = No Show; CX = cancelled     Future Appointments   Date Time Provider [place]   10/27/2021  9:30 AM Erica Cadet, PT [facility]   10/29/2021  9:30 AM Erica Cadet, PT [facility]   11/3/2021  9:30 AM Alana Foster N, PT [facility]   11/5/2021  9:30 AM Jorge Salguero, PT [facility]   11/10/2021  9:30 AM Dominguez Hicks, PTA [facility]   11/12/2021  9:30 AM Jorge Salguero, PT [facility]   11/18/2021 11:15 AM Agapito Rojo MD [facility] [place]

## 2021-10-21 ENCOUNTER — TRANSCRIBE ORDER (OUTPATIENT)
Dept: SCHEDULING | Age: 56
End: 2021-10-21

## 2021-10-21 DIAGNOSIS — Z12.31 SCREENING MAMMOGRAM FOR HIGH-RISK PATIENT: Primary | ICD-10-CM

## 2021-10-27 ENCOUNTER — HOSPITAL ENCOUNTER (OUTPATIENT)
Dept: PHYSICAL THERAPY | Age: 56
Discharge: HOME | End: 2021-10-27
Attending: ORTHOPAEDIC SURGERY
Payer: COMMERCIAL

## 2021-10-27 PROCEDURE — 97110 THERAPEUTIC EXERCISES: CPT

## 2021-10-27 NOTE — PROGRESS NOTES
Huma Tomlin Fl[place]  : 1965  Primary: Vinny Hernandez Ppo  Secondary:  [address] at [place] [address] [facility] [address], [address], [facility] [address]  [phone]   [phone]        OUTPATIENT PHYSICAL THERAPY: Daily Treatment Note 10/27/2021  Visit Count:  16    ICD-10: Treatment Diagnosis:   · Pain in left knee (M25.562)  · Stiffness of left knee, not elsewhere classified (M25.662)  Precautions/Allergies:   Mobic [meloxicam]   TREATMENT PLAN:  Effective Dates: 9/3/2021 TO 2021 (90 days). Frequency/Duration: 2 times a week for 90 Day(s) MEDICAL/REFERRING DIAGNOSIS:  Pain in left knee [M25.562]   DATE OF ONSET: Chronic; Surgery: 2021  REFERRING PHYSICIAN: Nati Stone MD MD Orders:    6-8 weeks: Wean from crutches. Full ROM. No weight bearing at flexion >90º. Continue previous exercises. Stretching and strengthening. Closed chain exercises (not past 90º). 8-12 weeks: Full weight bearing. Full ROM. Gradually increase to return to full activities at 12 weeks. Return MD Appointment: TBD     Pre-treatment Symptoms/Complaints:  10/27/2021: Patient reports she is doing really well. She report some discomfort after lots of walking this weekend, but otherwise doing great. Pain: Initial:   0/10 Post Session:  0/10   Medications Last Reviewed:  10/27/2021  Updated Objective Findings:  115 flexion actively - 1 extension  TREATMENT:     THERAPEUTIC EXERCISE: (40 minutes):  Exercises per grid below to improve mobility, strength, balance and coordination. Required minimal visual, verbal and manual cues to promote proper body alignment and promote proper body posture. Progressed resistance, range, repetitions and complexity of movement as indicated.    Date:  10/27/2021   Activity/Exercise Parameters   Manual Hamstring Stretch with ankle pumps 20 reps   Airdyne 8 minutes   Seat #6   Walking  200 feet with verbal cues for heel strike/toe off   Standing hip flexion 15 reps  1 pound  L LE    Standing hip extension 15 reps   1 pound  L LE    Standing knee flexion 15 reps   1 pound  L LE   Standing hip abduction 15 reps  1 pound  L LE   Step up 4 inch step  15 reps   Standing heel/toe raises 15 reps  1 pound  B LE   TKE Blue -band  15 reps  L LE   LAQ 20 reps  1 pound  B LE    Quad sets 20 reps  L LE  5 reps with overpressure   Heel slides 15 reps  L LE   SAQ 20 reps  1 pound  B LE    Bridging 15 reps   Straight leg raise 20 reps  1 pound  L LE     Treatment/Session Summary:    · Response to Treatment:  Patient tolerated treatment well with improving overall mobility. · Communication/Consultation:  None today  · Equipment provided today:  None today  · Recommendations/Intent for next treatment session: Next visit will focus on improving overall mobility and pain with daily activities.     Total Treatment Billable Duration:  40 minutes  PT Patient Time In/Time Out  Time In: 0930  Time Out: [facility], PT     Visit # Therapist initials Date Arrived NS/ Cx < 24 hr >24 hr Cx Visit Comments   1  9/3/2021 X   60 Visits Approved   2  9/7/2021 X      3 rjk 9/8/2021 X       4 rjk 9-13-21 X      5 rjk 9-15-21 x      6 rjk 9-20-21 x      7  9/24/2021 X       8  9/29/2021 X       9  10/1/2021 X    Progress Note   10  10/6/2021 X       11  10/8/2021 X       12  10/13/2021 X       13  10/15/2021 X       14 rjk 10-18-21 x      15 rjk 10-20-21 x      15  10/27/2021 X                  Abbreviations: NS = No Show; CX = cancelled     Future Appointments   Date Time Provider Kelton Orr   10/27/2021  9:30 AM Zorita Colin, PT [facility]   10/29/2021  9:30 AM Zorita Colin, PT [facility]   11/3/2021  9:30 AM Zorita Colin, PT [facility]   11/5/2021  9:30 AM Zorita Colin, PT [facility]   11/10/2021  9:30 AM Homer Contreras PTA [facility] E   11/12/2021  9:30 AM Navdeep Shaw, PT [facility]   11/18/2021 11:15 AM Son Galeano MD [facility] [place] 11/19/2021  3:45 PM CAT ZHONG  ROOM 2 VALERY SHELTON

## 2021-10-29 ENCOUNTER — HOSPITAL ENCOUNTER (OUTPATIENT)
Dept: PHYSICAL THERAPY | Age: 56
Discharge: HOME | End: 2021-10-29
Attending: ORTHOPAEDIC SURGERY
Payer: COMMERCIAL

## 2021-10-29 PROCEDURE — 97110 THERAPEUTIC EXERCISES: CPT

## 2021-10-29 NOTE — PROGRESS NOTES
Huma Tomlin [place]  : 1965  Primary: Vinny Hernandez Ppo  Secondary:  [address]  at [place] [address] [facility], [address] [place] [address],[address], [address] [facility]  [phone]   [phone]        OUTPATIENT PHYSICAL THERAPY: Daily Treatment Note 10/29/2021  Visit Count:  17    ICD-10: Treatment Diagnosis:   · Pain in left knee (M25.562)  · Stiffness of left knee, not elsewhere classified (M25.662)  Precautions/Allergies:   Mobic [meloxicam]   TREATMENT PLAN:  Effective Dates: 9/3/2021 TO 2021 (90 days). Frequency/Duration: 2 times a week for 90 Day(s) MEDICAL/REFERRING DIAGNOSIS:  Pain in left knee [M25.562]   DATE OF ONSET: Chronic; Surgery: 2021  REFERRING PHYSICIAN: Nati Stone MD MD Orders:    6-8 weeks: Wean from crutches. Full ROM. No weight bearing at flexion >90º. Continue previous exercises. Stretching and strengthening. Closed chain exercises (not past 90º). 8-12 weeks: Full weight bearing. Full ROM. Gradually increase to return to full activities at 12 weeks. Return MD Appointment: TBD     Pre-treatment Symptoms/Complaints:  10/29/2021: Patient reports she is doing well overall and still feeling good. Pain: Initial:   0/10 Post Session:  0/10   Medications Last Reviewed:  10/29/2021  Updated Objective Findings:  115 flexion actively - 1 extension  TREATMENT:     THERAPEUTIC EXERCISE: (40 minutes):  Exercises per grid below to improve mobility, strength, balance and coordination. Required minimal visual, verbal and manual cues to promote proper body alignment and promote proper body posture. Progressed resistance, range, repetitions and complexity of movement as indicated.    Date:  10/29/2021   Activity/Exercise Parameters   Manual Hamstring Stretch with ankle pumps 20 reps   Airdyne 8 minutes   Seat #6   Walking  200 feet with verbal cues for heel strike/toe off   Standing hip flexion 15 reps  1 pound  L LE    Standing hip extension 15 reps   1 pound  L LE Standing knee flexion 15 reps   1 pound  L LE   Standing hip abduction 15 reps  1 pound  L LE   Step up 6 inch step  15 reps   Step downs 6 inch step  15 reps   Tiltboard: right/left, forward/backward 20 reps each   Standing heel/toe raises 15 reps  1 pound  B LE   TKE Blue -band  15 reps  L LE   LAQ 20 reps  1 pound  B LE    Quad sets 20 reps  L LE  5 reps with overpressure   Heel slides 15 reps  L LE   SAQ 20 reps  1 pound  B LE    Bridging 15 reps   Straight leg raise 20 reps  1 pound  L LE     Treatment/Session Summary:    · Response to Treatment:  Patient tolerated treatment well with improving overall mobility. · Communication/Consultation:  None today  · Equipment provided today:  None today  · Recommendations/Intent for next treatment session: Next visit will focus on improving overall mobility and pain with daily activities.     Total Treatment Billable Duration:  40 minutes  PT Patient Time In/Time Out  Time In: 0930  Time Out: [facility], PT     Visit # Therapist initials Date Arrived NS/ Cx < 24 hr >24 hr Cx Visit Comments   1  9/3/2021 X   60 Visits Approved   2  9/7/2021 X      3 rjk 9/8/2021 X       4 rjk 9-13-21 X      5 rjk 9-15-21 x      6 rjk 9-20-21 x      7  9/24/2021 X       8  9/29/2021 X       9  10/1/2021 X    Progress Note   10  10/6/2021 X       11  10/8/2021 X       12  10/13/2021 X       13  10/15/2021 X       14 rjk 10-18-21 x      15 rjk 10-20-21 x      15  10/27/2021 X       16  10/29/2021 X         Abbreviations: NS = No Show; CX = cancelled     Future Appointments   Date Time Provider Kelton Orr   10/29/2021  9:30 AM Erica Cadet, PT [facility]   11/3/2021  9:30 AM Erica Cadet, PT [facility]   11/5/2021  9:30 AM Erica Cadet, PT [facility]   11/10/2021  9:30 AM Zay Lomax, PTA [facility]   11/12/2021  9:30 AM Erica Tinsley PT [facility]   11/18/2021 11:15 AM Jorden Mckenna MD [facility] [place]   11/19/2021  3:45 PM SFE TREVIN BI ROOM 2 SFERMAM SFE

## 2021-11-03 ENCOUNTER — HOSPITAL ENCOUNTER (OUTPATIENT)
Dept: PHYSICAL THERAPY | Age: 56
Discharge: HOME | End: 2021-11-03
Attending: ORTHOPAEDIC SURGERY
Payer: COMMERCIAL

## 2021-11-03 PROCEDURE — 97110 THERAPEUTIC EXERCISES: CPT

## 2021-11-03 NOTE — PROGRESS NOTES
Jesenia Liu Flaspoehler  : 1965  Primary: Mary Beth Seth Ppo  Secondary:  [address]  at [place] [address] [address], [address] [facility]  [phone]   [phone]        OUTPATIENT PHYSICAL THERAPY: Daily Treatment Note 11/3/2021  Visit Count:  18    ICD-10: Treatment Diagnosis:   · Pain in left knee (M25.562)  · Stiffness of left knee, not elsewhere classified (M25.662)  Precautions/Allergies:   Mobic [meloxicam]   TREATMENT PLAN:  Effective Dates: 9/3/2021 TO 2021 (90 days). Frequency/Duration: 2 times a week for 90 Day(s) MEDICAL/REFERRING DIAGNOSIS:  Pain in left knee [M25.562]   DATE OF ONSET: Chronic; Surgery: 2021  REFERRING PHYSICIAN: Nolan Condon MD MD Orders:    6-8 weeks: Wean from crutches. Full ROM. No weight bearing at flexion >90º. Continue previous exercises. Stretching and strengthening. Closed chain exercises (not past 90º). 8-12 weeks: Full weight bearing. Full ROM. Gradually increase to return to full activities at 12 weeks. Return MD Appointment: DREA     Pre-treatment Symptoms/Complaints:  11/3/2021: Patient reports she is doing well today. Pain: Initial:   0/10 Post Session:  0/10   Medications Last Reviewed:  11/3/2021  Updated Objective Findings:  115 flexion actively - 1 extension  TREATMENT:     THERAPEUTIC EXERCISE: (40 minutes):  Exercises per grid below to improve mobility, strength, balance and coordination. Required minimal visual, verbal and manual cues to promote proper body alignment and promote proper body posture. Progressed resistance, range, repetitions and complexity of movement as indicated.    Date:  11/3/2021   Activity/Exercise Parameters   Manual Hamstring Stretch with ankle pumps 20 reps   Airdyne 8 minutes   Seat #5   Standing hip flexion 15 reps  1 pound  L LE    Standing hip extension 15 reps   1 pound  L LE    Standing knee flexion 15 reps   1 pound  L LE   Standing hip abduction 15 reps  1 pound  L LE   Step up 6 inch step  15 reps   Step downs 6 inch step  15 reps   Tiltboard: right/left, forward/backward 20 reps each   Standing heel/toe raises 15 reps  1 pound  B LE   TKE Blue -band  15 reps  L LE   LAQ 20 reps  1 pound  B LE    Quad sets 20 reps  L LE  5 reps with overpressure   Heel slides 15 reps  L LE   SAQ 20 reps  1 pound  B LE    Bridging 15 reps   Straight leg raise 20 reps  1 pound  L LE     Treatment/Session Summary:    · Response to Treatment:  Patient tolerated treatment well with improving overall mobility. · Communication/Consultation:  None today  · Equipment provided today:  None today  · Recommendations/Intent for next treatment session: Next visit will focus on improving overall mobility and pain with daily activities.     Total Treatment Billable Duration:  40 minutes  PT Patient Time In/Time Out  Time In: 0930  Time Out: [facility], PT     Visit # Therapist initials Date Arrived NS/ Cx < 24 hr >24 hr Cx Visit Comments   1  9/3/2021 X   60 Visits Approved   2  9/7/2021 X      3 rjk 9/8/2021 X       4 rjk 9-13-21 X      5 rjk 9-15-21 x      6 rjk 9-20-21 x      7  9/24/2021 X       8  9/29/2021 X       9  10/1/2021 X    Progress Note   10  10/6/2021 X       11  10/8/2021 X       12  10/13/2021 X       13  10/15/2021 X       14 rjk 10-18-21 x      15 rjk 10-20-21 x      15  10/27/2021 X       16  10/29/2021 X       17  11/3/2021 X         Abbreviations: NS = No Show; CX = cancelled     Future Appointments   Date Time Provider Kelton Orr   11/3/2021  9:30 AM Jenny Limber, PT [facility]   11/5/2021  9:30 AM Jenny Limber, PT [facility]   11/10/2021  9:30 AM Nemesio Riff, PTA [facility]   11/12/2021  9:30 AM Jenny Limber, PT [facility]   11/18/2021 11:15 AM Leonidas Astudillo MD [facility] POAI POA   11/19/2021  3:45 PM R[facility] ROOM 2 VALERY SHELTON

## 2021-11-03 NOTE — PROGRESS NOTES
Damian Daugherty Flaspoehler  : 1965  Primary: She Tijerina Ppo  Secondary:  [address] at [place] [id number] [facility], [address] [place] [address] [address], [address] [facility]  [phone]   [phone]          OUTPATIENT PHYSICAL THERAPY:Progress Report 11/3/2021   ICD-10: Treatment Diagnosis:   · Pain in left knee (M25.562)  · Stiffness of left knee, not elsewhere classified (M25.662)  Precautions/Allergies:   Mobic [meloxicam]   TREATMENT PLAN:  Effective Dates: 9/3/2021 TO 2021 (90 days). Frequency/Duration: 2 times a week for 90 Day(s) MEDICAL/REFERRING DIAGNOSIS:  Pain in left knee [M25.562]   DATE OF ONSET: Chronic; Surgery: 2021  REFERRING PHYSICIAN: Clyde Collins MD MD Orders:   0-6 weeks: 1st week: TDWB; After 1st week: Cherylynn Roch extension(no weight bearing with knee flexed past 90º) with crutches0-90º; Immobilize in full extension at night for 1st week. Heel slides, quad sets, straight leg raises. 6-8 weeks: Wean from crutches. Full ROM. No weight bearing at flexion >90º. Continue previous exercises. Stretching and strengthening. Closed chain exercises (not past 90º). 8-12 weeks: Full weight bearing. Full ROM. Gradually increase to return to full activities at 12 weeks. Return MD Appointment: TBD     ASSESSMENT:  Ms. Jazlyn Boyle presents with improving mobility, strength, and pain in left knee following recent left meniscal repair. Patient has attended a total of 18 scheduled physical therapy visits including initial evaluation on 9/3/2021. Treatment has consisted of mobility and strengthening activities to improve overall safety, mobility, and performance with activities of daily living. Patient is making excellent progress with all aspects of therapy at this time. PROBLEM LIST (Impacting functional limitations):  1. Decreased Strength  2. Decreased ADL/Functional Activities  3. Decreased Ambulation Ability/Technique  4. Decreased Balance  5.  Increased Pain  6. Decreased Activity Tolerance INTERVENTIONS PLANNED: (Treatment may consist of any combination of the following)  1. Balance Exercise  2. Cold  3. Electrical Stimulation  4. Gait Training  5. Heat  6. Home Exercise Program (HEP)  7. Manual Therapy  8. Neuromuscular Re-education/Strengthening  9. Range of Motion (ROM)  10. Therapeutic Activites  11. Therapeutic Exercise/Strengthening  12. Transcutaneous Electrical Nerve Stimulation (TENS)     GOALS: (Goals have been discussed and agreed upon with patient.)  Short-Term Functional Goals: Time Frame: 30 days  1. Patient will be independent with home exercise program without exacerbation of symptoms or cueing needed--gaol met. 2. Patient will be independent with correct sleeping positions and awareness/avoidance of aggravating positions without cueing needed--goal met. Discharge Goals: Time Frame: 90 days  1. Patient will be independent with all ADLs with minimal onset of left knee pain and no deficits with daily tasks--goal met. 2. Patient will report no fear avoidance with social or recreational activities due to left knee pain --goal ongoing. 3. Patient will score greater than or equal to 60/80 on Lower Extremity Functional Scale with minimal effect of left knee pain on patient's ability to manage every day life activities--goal ongoing. OUTCOME MEASURE:   Tool Used: Lower Extremity Functional Scale (LEFS)  Score:  Initial: 12/80 Most Recent: 52/80 (Date: 11/3/2021 )   Interpretation of Score: 20 questions each scored on a 5 point scale with 0 representing \"extreme difficulty or unable to perform\" and 4 representing \"no difficulty\". The lower the score, the greater the functional disability. 80/80 represents no disability. Minimal detectable change is 9 points. MEDICAL NECESSITY:   · Patient is expected to demonstrate progress in strength, range of motion, balance and coordination to improve safety during daily activities.   · Patient demonstrates good rehab potential due to higher previous functional level. · Skilled intervention continues to be required due to decreased functional mobility. REASON FOR SERVICES/OTHER COMMENTS:  · Patient continues to demonstrate capacity to improve overall functional mobility which will increase independence and increase safety. Total Duration:  PT Patient Time In/Time Out  Time In: 0930  Time Out: 1015    Rehabilitation Potential For Stated Goals: Good     PAIN/SUBJECTIVE:   Initial:   0/10 Post Session:  0/10   HISTORY:   History of Injury/Illness (Reason for Referral):  Patient reports her surgery to repair the meniscus on her L LE was on Wednesday (9/1/2021). She reports she is doing really well. She report she has minimal pain in her knee. She reports she has some pressure and soreness from the surgery, but otherwise her knee feels better. She reports that she is doing well with her restrictions with only TTWB and the brace locked at 0 degrees. She reports that she is using the CPM to tolerance right now. She reports she is feeling good overall. She reports she is ready to get moving and feeling better so she can get back to work. 10/1/2021 (Progress Note): Patient reports she is doing well overall with no real pain. She reports she is stiff in the AM and so doing the CPM first thing does help to loosen the knee. She reports she is ready to get back to putting her full weight on her knee again so she can get back to work. 11/3/2021 (Progress Note): Patient reports she is doing well and her knee is feeling more normal all the time. Past Medical History/Comorbidities:   Ms. Nayana Meeks  has a past medical history of Arthritis, Asthma, COVID-19 (10/2020), and H/O seasonal allergies. She also has no past medical history of Coagulation defects, COPD, Difficult intubation, Malignant hyperthermia due to anesthesia, Nausea & vomiting, or Pseudocholinesterase deficiency.   Ms. Nayana Meeks  has a past surgical history that includes hx cholecystectomy; hx dilation and curettage (11/2010); hx hysterectomy (5/2011); and hx other surgical (Right, 2019). Social History/Living Environment:   Home Environment: Private residence  Living Alone: No  Support Systems: Family member(s), Friends \ neighbors, Spouse/Significant Other/Partner  Prior Level of Function/Work/Activity:  Independent  Dominant Side:         RIGHT  Personal Factors:          Sex:  female        Age:  61 y.o. Current Medications:       Current Outpatient Medications:     senna-docusate (PERICOLACE) 8.6-50 mg per tablet, Take 1 Tablet by mouth daily. To start after surgery  Indications: constipation (Patient not taking: Reported on 9/1/2021), Disp: 21 Tablet, Rfl: 0    ondansetron (ZOFRAN ODT) 4 mg disintegrating tablet, 1 Tablet by SubLINGual route every six (6) hours as needed for Nausea or Nausea or Vomiting. To start after surgery  Indications: prevent nausea and vomiting after surgery (Patient not taking: Reported on 9/1/2021), Disp: 20 Tablet, Rfl: 0    celecoxib (CELEBREX) 100 mg capsule, Take 1 Cap by mouth daily. , Disp: 90 Cap, Rfl: 3    loratadine (CLARITIN) 10 mg tablet, Take 10 mg by mouth daily. Instructed to take DOS per anesthesia guidelines , Disp: , Rfl:     multivitamin capsule, Take 1 Cap by mouth daily. , Disp: , Rfl:    Date Last Reviewed:  11/3/2021    EXAMINATION:   Observation/Orthostatic Postural Assessment:          Posture Assessment: Rounded shoulders, Forward head   Palpation:          Tenderness to palpation noted along posterior aspect of left knee. Minimal swelling. No bruising noted.     ROM:          L LE Assessment (AROM):   · Hip Flexion: 90 degrees    · Hip Extension: 10 degrees    · Hip Abduction: 15 degrees    · Hip Adduction: 0 degrees    · Hip Internal rotation: Not tested     · Hip External rotation: Not tested    · Knee Flexion: 110 degrees    · Knee Extension: 2 degrees     Strength:          L LE Assessment (Strength):   · Hip Flexion: 4/5 with manual muscle testing    · Hip Extension: 4/5 with manual muscle testing    · Hip Abduction: 4/5 with manual muscle testing    · Hip Adduction: 4/5 with manual muscle testing    · Knee Flexion: 4-/5 with manual muscle testing    · Knee Extension: 4-/5 with manual muscle testing     Special Tests:          Negative  Neurological Screen:        Dermatomes: Within normal limits        Reflexes:  2+  Functional Mobility:   Gait/Mobility:    Base of Support: Within normal limits  Speed/Charleen: Within normal limits  Step Length:  Within normal limits  Swing Pattern: Left symmetrical, Right symmetrical  Gait Abnormalities: Within normal limits  Ambulation - Level of Assistance: Independent  Assistive Device: None  · Interventions: Verbal cues, Safety awareness training          Transfers:     Sit to Stand: Independent  Stand to Sit: Independent  Stand Pivot Transfers: Independent  Bed to Chair: Independent  Lateral Transfers: Independent         Bed Mobility:     Rolling: Independent  Supine to Sit: Independent  Sit to Supine: Independent  Scooting: Independent

## 2021-11-05 ENCOUNTER — HOSPITAL ENCOUNTER (OUTPATIENT)
Dept: PHYSICAL THERAPY | Age: 56
Discharge: HOME | End: 2021-11-05
Attending: ORTHOPAEDIC SURGERY
Payer: COMMERCIAL

## 2021-11-05 PROCEDURE — 97110 THERAPEUTIC EXERCISES: CPT

## 2021-11-05 NOTE — PROGRESS NOTES
Bere Lomas Flaspoehler  : 1965  Primary: Ottoniel Villarreal Ppo  Secondary:  [address]  at [facility] [place] [address], [address] [facility]  [phone]   [phone]        OUTPATIENT PHYSICAL THERAPY: Daily Treatment Note 2021  Visit Count:  19    ICD-10: Treatment Diagnosis:   · Pain in left knee (M25.562)  · Stiffness of left knee, not elsewhere classified (M25.662)  Precautions/Allergies:   Mobic [meloxicam]   TREATMENT PLAN:  Effective Dates: 9/3/2021 TO 2021 (90 days). Frequency/Duration: 2 times a week for 90 Day(s) MEDICAL/REFERRING DIAGNOSIS:  Pain in left knee [M25.562]   DATE OF ONSET: Chronic; Surgery: 2021  REFERRING PHYSICIAN: Tiffanie Chavez MD MD Orders:    6-8 weeks: Wean from crutches. Full ROM. No weight bearing at flexion >90º. Continue previous exercises. Stretching and strengthening. Closed chain exercises (not past 90º). 8-12 weeks: Full weight bearing. Full ROM. Gradually increase to return to full activities at 12 weeks. Return MD Appointment: TBD     Pre-treatment Symptoms/Complaints:  2021: Patient reports she is feeling good. She reports she did not get on the CPM this morning so she will see how her knee feels on the bike. Pain: Initial:   0/10 Post Session:  0/10   Medications Last Reviewed:  2021  Updated Objective Findings:  115 flexion actively - 1 extension  TREATMENT:     THERAPEUTIC EXERCISE: (40 minutes):  Exercises per grid below to improve mobility, strength, balance and coordination. Required minimal visual, verbal and manual cues to promote proper body alignment and promote proper body posture. Progressed resistance, range, repetitions and complexity of movement as indicated.    Date:  2021   Activity/Exercise Parameters   Manual Hamstring Stretch with ankle pumps 20 reps   Airdyne 8 minutes   Seat #5   Standing hip flexion 15 reps  1 pound  L LE    Standing hip extension 15 reps   1 pound  L LE    Standing knee flexion 15 reps   1 pound  L LE   Standing hip abduction 15 reps  1 pound  L LE   Step up 6 inch step  15 reps   Step downs 6 inch step  15 reps   Tiltboard: right/left, forward/backward 20 reps each   Standing heel/toe raises 15 reps  1 pound  B LE   TKE Blue -band  15 reps  L LE   LAQ 20 reps  1 pound  B LE    Quad sets 20 reps  L LE  5 reps with overpressure   Heel slides 15 reps  L LE   SAQ 20 reps  1 pound  B LE    Bridging 15 reps   Straight leg raise 20 reps  1 pound  L LE     Treatment/Session Summary:    · Response to Treatment:  Patient tolerated treatment well with improving overall mobility. · Communication/Consultation:  None today  · Equipment provided today:  None today  · Recommendations/Intent for next treatment session: Next visit will focus on improving overall mobility and pain with daily activities.     Total Treatment Billable Duration:  40 minutes  PT Patient Time In/Time Out  Time In: 0930  Time Out: [facility], PT     Visit # Therapist initials Date Arrived NS/ Cx < 24 hr >24 hr Cx Visit Comments   1  9/3/2021 X   60 Visits Approved   2  9/7/2021 X      3 rjk 9/8/2021 X       4 rjk 9-13-21 X      5 rjk 9-15-21 x      6 rjk 9-20-21 x      7  9/24/2021 X       8  9/29/2021 X       9  10/1/2021 X    Progress Note   10  10/6/2021 X       11  10/8/2021 X       12  10/13/2021 X       13  10/15/2021 X       14 rjk 10-18-21 x      15 rjk 10-20-21 x      15  10/27/2021 X       16  10/29/2021 X       17  11/3/2021 X    Progress Note   18  11/5/2021 X         Abbreviations: NS = No Show; CX = cancelled     Future Appointments   Date Time Provider Kelton Orr   11/5/2021  9:30 AM Bia Lew, PT [facility]   11/10/2021  9:30 AM Shadi Lozoya, PTA [facility]   11/12/2021  9:30 AM Bia Lew, PT [facility]   11/18/2021 11:15 AM MD VIVIEN Reynolds POAI POA   11/19/2021  3:45 PM CAT [facility] ROOM 2 VALERY SHELTON

## 2021-11-10 ENCOUNTER — HOSPITAL ENCOUNTER (OUTPATIENT)
Dept: PHYSICAL THERAPY | Age: 56
Discharge: HOME | End: 2021-11-10
Attending: ORTHOPAEDIC SURGERY
Payer: COMMERCIAL

## 2021-11-10 PROCEDURE — 97110 THERAPEUTIC EXERCISES: CPT

## 2021-11-10 NOTE — PROGRESS NOTES
Vipin Cole Fl[place]oe[place]  : 1965  Primary: Natalia Henson Ppo  Secondary:  [address] at [facility]  [address], [address], [address]  [phone]   [phone]        OUTPATIENT PHYSICAL THERAPY: Daily Treatment Note 11/10/2021  Visit Count:  20    ICD-10: Treatment Diagnosis:   · Pain in left knee (M25.562)  · Stiffness of left knee, not elsewhere classified (M25.662)  Precautions/Allergies:   Mobic [meloxicam]   TREATMENT PLAN:  Effective Dates: 9/3/2021 TO 2021 (90 days). Frequency/Duration: 2 times a week for 90 Day(s) MEDICAL/REFERRING DIAGNOSIS:  Pain in left knee [M25.562]   DATE OF ONSET: Chronic; Surgery: 2021  REFERRING PHYSICIAN: Rick Edwards MD MD Orders:    6-8 weeks: Wean from crutches. Full ROM. No weight bearing at flexion >90º. Continue previous exercises. Stretching and strengthening. Closed chain exercises (not past 90º). 8-12 weeks: Full weight bearing. Full ROM. Gradually increase to return to full activities at 12 weeks. Return MD Appointment: TBD     Pre-treatment Symptoms/Complaints:  11/10/2021: I am good -\" I have had some swelling in my leg and down to my ankle-I have tried to elevate it daily, no pain in calf\"    Pain: Initial:   0/10 Post Session:  0/10   Medications Last Reviewed:  11/10/2021  Updated Objective Findings:  3-121  TREATMENT:     THERAPEUTIC EXERCISE: (40 minutes):  Exercises per grid below to improve mobility, strength, balance and coordination. Required minimal visual, verbal and manual cues to promote proper body alignment and promote proper body posture. Progressed resistance, range, repetitions and complexity of movement as indicated.    Date:  11/10/2021   Activity/Exercise Parameters   Manual Hamstring Stretch with ankle pumps 20 reps   Airdyne 8 minutes   Seat #5   Standing hip flexion 15 reps  2 pound  L LE    Standing hip extension 15 reps   2 pound  L LE    Standing knee flexion 15 reps   2 pound  L LE   Standing hip abduction 15 reps  2 pound  L LE   Step up laterally lowering 6 in step X 12 reps   Step up 6 inch step  15 reps   Step downs 6 inch step  15 reps   Tiltboard: right/left, forward/backward x   Standing heel/toe raises 15 reps  2 pound  B LE   Mini squat holds X 15 hold 5 sec   TKE Black -band  15 reps  L LE   LAQ 20 reps  2 pound  B LE    Nautilus Leg Press x   Quad sets 20 reps  L LE  5 reps with overpressure   Heel slides 15 reps  L LE   SAQ x   Bridging 15 reps   Straight leg raise 20 reps  2 pound  L LE     Treatment/Session Summary:    · Response to Treatment:  Patient tolerated treatment well with improving overall mobility. Increased ROM into flexion today. Swelling around ankle, patient elevating leg at home when she is off it. No pain in calf. · Communication/Consultation:  None today  · Equipment provided today:  None today  · Recommendations/Intent for next treatment session: Next visit will focus on improving overall mobility and pain with daily activities.     Total Treatment Billable Duration:  40 minutes  PT Patient Time In/Time Out  Time In: 0930  Time Out: [address], PTA     Visit # Therapist initials Date Arrived NS/ Cx < 24 hr >24 hr Cx Visit Comments   1  9/3/2021 X   60 Visits Approved   2 JS 9/7/2021 X      3 rjk 9/8/2021 X       4 rjk 9-13-21 X      5 rjk 9-15-21 x      6 rjk 9-20-21 x      7  9/24/2021 X       8  9/29/2021 X       9  10/1/2021 X    Progress Note   10  10/6/2021 X       11  10/8/2021 X       12  10/13/2021 X       13 JS 10/15/2021 X       14 rjk 10-18-21 x      15 rjk 10-20-21 x      15 JS 10/27/2021 X       16  10/29/2021 X       17  11/3/2021 X    Progress Note   18  11-5-2021 x      19 rjk 11/10/2021 X         Abbreviations: NS = No Show; CX = cancelled     Future Appointments   Date Time Provider Kelton Orr   11/10/2021  9:30 AM Zhou Santiago PTA [facility] SFE   11/12/2021  9:30 AM Jenny Morin PT [facility] CAT   11/18/2021 11:15 AM Leonidas Astudillo MD [facility] POAI POA   11/19/2021  3:45 PM CAT [facility] ROOM 2 VALERY SHELTON

## 2021-11-12 ENCOUNTER — HOSPITAL ENCOUNTER (OUTPATIENT)
Dept: PHYSICAL THERAPY | Age: 56
Discharge: HOME | End: 2021-11-12
Attending: ORTHOPAEDIC SURGERY
Payer: COMMERCIAL

## 2021-11-12 PROCEDURE — 97110 THERAPEUTIC EXERCISES: CPT

## 2021-11-12 NOTE — PROGRESS NOTES
Sailaja Lopez Flaspoehler  : 1965  Primary: Gary Henderson Ppo  Secondary:  [address] at [place] [id number] [facility], [address] [place] [address],[address], [facility] 91.  [phone]   [phone]        OUTPATIENT PHYSICAL THERAPY: Daily Treatment Note 2021  Visit Count:  21    ICD-10: Treatment Diagnosis:   · Pain in left knee (M25.562)  · Stiffness of left knee, not elsewhere classified (M25.662)  Precautions/Allergies:   Mobic [meloxicam]   TREATMENT PLAN:  Effective Dates: 9/3/2021 TO 2021 (90 days). Frequency/Duration: 2 times a week for 90 Day(s) MEDICAL/REFERRING DIAGNOSIS:  Pain in left knee [M25.562]   DATE OF ONSET: Chronic; Surgery: 2021  REFERRING PHYSICIAN: Andrew Panchal MD MD Orders:    6-8 weeks: Wean from crutches. Full ROM. No weight bearing at flexion >90º. Continue previous exercises. Stretching and strengthening. Closed chain exercises (not past 90º). 8-12 weeks: Full weight bearing. Full ROM. Gradually increase to return to full activities at 12 weeks. Return MD Appointment: TBD     Pre-treatment Symptoms/Complaints:  2021: Patient reports the swelling in her leg is better, but she's been more aware of keeping her foot elevated. Pain: Initial:   0/10 Post Session:  0/10   Medications Last Reviewed:  2021  Updated Objective Findings:  3-121  TREATMENT:     THERAPEUTIC EXERCISE: (40 minutes):  Exercises per grid below to improve mobility, strength, balance and coordination. Required minimal visual, verbal and manual cues to promote proper body alignment and promote proper body posture. Progressed resistance, range, repetitions and complexity of movement as indicated.    Date:  2021   Activity/Exercise Parameters   Manual Hamstring Stretch with ankle pumps 20 reps   Airdyne 8 minutes   Seat #5   Standing hip flexion 15 reps  2 pound  L LE    Standing hip extension 15 reps   2 pound  L LE    Standing knee flexion 15 reps 2 pound  L LE   Standing hip abduction 15 reps  2 pound  L LE   Step up laterally lowering 6 in step X 12 reps   Step up 6 inch step  15 reps   Step downs 6 inch step  15 reps   Tiltboard: right/left, forward/backward x   Standing heel/toe raises 15 reps  2 pound  B LE   Mini squat holds X 15 hold 5 sec   TKE Black -band  15 reps  L LE   LAQ 20 reps  2 pound  B LE    Nautilus Leg Press x   Quad sets 20 reps  L LE  5 reps with overpressure   Heel slides 15 reps  L LE   SAQ x   Bridging 15 reps   Straight leg raise 20 reps  2 pound  L LE     Treatment/Session Summary:    · Response to Treatment:  Patient tolerated treatment well with improving overall mobility. · Communication/Consultation:  None today  · Equipment provided today:  None today  · Recommendations/Intent for next treatment session: Next visit will focus on improving overall mobility and pain with daily activities.     Total Treatment Billable Duration:  40 minutes  PT Patient Time In/Time Out  Time In: 0930  Time Out: [address], PT     Visit # Therapist initials Date Arrived NS/ Cx < 24 hr >24 hr Cx Visit Comments   1  9/3/2021 X   60 Visits Approved   2  9/7/2021 X      3 rjk 9/8/2021 X       4 rjk 9-13-21 X      5 rjk 9-15-21 x      6 rjk 9-20-21 x      7  9/24/2021 X       8  9/29/2021 X       9  10/1/2021 X    Progress Note   10  10/6/2021 X       11  10/8/2021 X       12  10/13/2021 X       13  10/15/2021 X       14 rjk 10-18-21 x      15 rjk 10-20-21 x      15  10/27/2021 X       16  10/29/2021 X       17  11/3/2021 X    Progress Note   18  11-5-2021 x      19 rjk 11/10/2021 X       20 JS 11/12/2021 X         Abbreviations: NS = No Show; CX = cancelled     Future Appointments   Date Time Provider Kelton Orr   11/16/2021  4:00 PM [facility]   11/18/2021 11:15 AM Lrorie Lenz MD [facility]A   11/19/2021  3:45 PM RANDYE TREVIN  ROOM 2 [facility]   11/22/2021  8:45 AM Jacky Fitting J, PTA [facility]   12/3/2021  8:00 AM Beaumont Pane, PT [facility]FE   12/9/2021  8:00 AM Gilberto Pane, PT [facility]   12/16/2021  8:00 AM Beaumont Pane, PT [facility]   12/21/2021  1:45 PM Gilberto Pane, PT [facility]FE   12/27/2021  1:45 PM Kimber Ahuja, PTA [facility]FE

## 2021-11-16 ENCOUNTER — HOSPITAL ENCOUNTER (OUTPATIENT)
Dept: PHYSICAL THERAPY | Age: 56
Discharge: HOME | End: 2021-11-16
Attending: ORTHOPAEDIC SURGERY
Payer: COMMERCIAL

## 2021-11-16 PROCEDURE — 97110 THERAPEUTIC EXERCISES: CPT

## 2021-11-16 NOTE — PROGRESS NOTES
Caro Fuentes  : 1965  Primary: Gemma Key Ppo  Secondary:  [address] at [facility]  [address] [facility], [address] [place] [address],[address], [facility] 91.  [phone]   [phone]        OUTPATIENT PHYSICAL THERAPY: Daily Treatment Note 2021  Visit Count:  22    ICD-10: Treatment Diagnosis:   · Pain in left knee (M25.562)  · Stiffness of left knee, not elsewhere classified (M25.662)  Precautions/Allergies:   Mobic [meloxicam]   TREATMENT PLAN:  Effective Dates: 9/3/2021 TO 2021 (90 days). Frequency/Duration: 2 times a week for 90 Day(s) MEDICAL/REFERRING DIAGNOSIS:  Pain in left knee [M25.562]   DATE OF ONSET: Chronic; Surgery: 2021  REFERRING PHYSICIAN: Sandra Zarate MD MD Orders:    6-8 weeks: Wean from crutches. Full ROM. No weight bearing at flexion >90º. Continue previous exercises. Stretching and strengthening. Closed chain exercises (not past 90º). 8-12 weeks: Full weight bearing. Full ROM. Gradually increase to return to full activities at 12 weeks. Return MD Appointment: TBD     Pre-treatment Symptoms/Complaints:  2021: Patient reports the swelling in her leg is better, but she's been more aware of keeping her foot elevated. Pain: Initial:   0/10 Post Session:  0/10   Medications Last Reviewed:  2021  Updated Objective Findings:  125 degrees flexion -6, with overpressure into extenion -1  TREATMENT:     THERAPEUTIC EXERCISE: (40 minutes):  Exercises per grid below to improve mobility, strength, balance and coordination. Required minimal visual, verbal and manual cues to promote proper body alignment and promote proper body posture. Progressed resistance, range, repetitions and complexity of movement as indicated.    Date:     Activity/Exercise Parameters   Manual Hamstring Stretch with ankle pumps 20 reps   Airdyne 8 minutes   Seat #4   Nautilus Leg Curl X 45 x 20 reps   Standing hip flexion 15 reps  2 pound  L LE    Standing hip extension 15 reps   2 pound  L LE    Standing knee flexion 15 reps   2 pound  L LE   Standing hip abduction 15 reps  2 pound  L LE   Step up laterally lowering 6 in step X 12 reps   Step up 6 inch step  15 reps   Step downs 6 inch step  15 reps   Tiltboard: right/left, forward/backward X 15 reps   Standing heel/toe raises 15 reps  2 pound  B LE   Mini squat holds X 15 hold 5 sec   TKE Black -band  15 reps  L LE   LAQ 20 reps  2 pound  B LE    Nautilus Leg Press # 60 x 10   Quad sets 20 reps  L LE  5 reps with overpressure   Heel slides 15 reps  L LE   SAQ x   Bridging 15 reps   Straight leg raise 20 reps  2 pound  L LE     Treatment/Session Summary:    · Response to Treatment:  Patient tolerated treatment well with improving overall mobility. Added machines today. · Communication/Consultation:  None today  · Equipment provided today:  None today  · Recommendations/Intent for next treatment session: Next visit will focus on improving overall mobility and pain with daily activities.     Total Treatment Billable Duration:  40 minutes  PT Patient Time In/Time Out  Time In: 1600  Time Out: [address] [facility]     Visit # Therapist initials Date Arrived NS/ Cx < 24 hr >24 hr Cx Visit Comments   1  9/3/2021 X   60 Visits Approved   2  9/7/2021 X      3 rjk 9/8/2021 X       4 rjk 9-13-21 X      5 rjk 9-15-21 x      6 rjk 9-20-21 x      7  9/24/2021 X       8  9/29/2021 X       9  10/1/2021 X    Progress Note   10  10/6/2021 X       11  10/8/2021 X       12  10/13/2021 X       13 JS 10/15/2021 X       14 rjk 10-18-21 x      15 rjk 10-20-21 x      15 JS 10/27/2021 X       16  10/29/2021 X       17 JS 11/3/2021 X    Progress Note   18  11-5-2021 x      19 rjk 11/10/2021 X       20 JS 11-12-21 x      20 rjk 11/16/2021 X         Abbreviations: NS = No Show; CX = cancelled     Future Appointments   Date Time Provider Kelton Orr   11/16/2021  4:00 PM aZy Lomax, PTA [facility]   11/18/2021 11:15 AM Akanksha Malone MD [facility]SA [facility] POA   11/19/2021  3:45 PM SFE \[facility]\"" ROOM 2 [facility]ER[facility]AM SFE   11/22/2021  8:45 AM Roselia Watson, PTA [facility]   12/3/2021  8:00 AM Marisabel Burks, PT [facility]   12/9/2021  8:00 AM Marisabel Burks, PT [facility]   12/16/2021  8:00 AM Marisabel Burks, PT [facility]   12/21/2021  1:45 PM Marisabel Burks, PT [facility]   12/27/2021  1:45 PM Alphonso Ahuja, PTA [facility]

## 2021-11-22 ENCOUNTER — HOSPITAL ENCOUNTER (OUTPATIENT)
Dept: PHYSICAL THERAPY | Age: 56
Discharge: HOME | End: 2021-11-22
Attending: ORTHOPAEDIC SURGERY
Payer: COMMERCIAL

## 2021-11-22 PROCEDURE — 97110 THERAPEUTIC EXERCISES: CPT

## 2021-11-22 NOTE — PROGRESS NOTES
Lucky Baumgarten Flaspoehlcameron  : 1965  Primary: Lulu Galeano Ppo  Secondary:  [address] at [address] [facility]  [address], [address], [place] 91.  [phone]   [phone]        OUTPATIENT PHYSICAL THERAPY: Daily Treatment Note 2021  Visit Count:  23    ICD-10: Treatment Diagnosis:   · Pain in left knee (M25.562)  · Stiffness of left knee, not elsewhere classified (M25.662)  Precautions/Allergies:   Mobic [meloxicam]   TREATMENT PLAN:  Effective Dates: 9/3/2021 TO 2021 (90 days). Frequency/Duration: 2 times a week for 90 Day(s) MEDICAL/REFERRING DIAGNOSIS:  Pain in left knee [M25.562]   DATE OF ONSET: Chronic; Surgery: 2021  REFERRING PHYSICIAN: Nat Armstrong MD MD Orders:    6-8 weeks: Wean from crutches. Full ROM. No weight bearing at flexion >90º. Continue previous exercises. Stretching and strengthening. Closed chain exercises (not past 90º). 8-12 weeks: Full weight bearing. Full ROM. Gradually increase to return to full activities at 12 weeks. Return MD Appointment: TBD     Pre-treatment Symptoms/Complaints:  2021:\"It still has some swelling\"    Pain: Initial:   0/10 Post Session:  0/10   Medications Last Reviewed:  2021  Updated Objective Findings:  126 degrees flexion -1, with overpressure into extenion   TREATMENT:     THERAPEUTIC EXERCISE: (40 minutes):  Exercises per grid below to improve mobility, strength, balance and coordination. Required minimal visual, verbal and manual cues to promote proper body alignment and promote proper body posture. Progressed resistance, range, repetitions and complexity of movement as indicated.    Date:     Activity/Exercise Parameters   Manual Hamstring Stretch with ankle pumps 20 reps   Airdyne 8 minutes   Seat #4   SLS Trampoline Toss X 20 throws   Nautilus Leg Curl X 45 x 20 reps   Standing hip flexion 15 reps  2 pound  L LE  x   Standing hip extension 15 reps   2 pound  L LE   x   Standing knee flexion 15 reps   2 pound  L LE   x   Tband Side stepping  Monster Walk Green x 2 laps  Green x 2 laps   Standing hip abduction 15 reps  2 pound  L LE   Step up laterally lowering 6 in step X 12 reps   Step up x   Step downs 6 inch step  15 reps   Tiltboard: right/left, forward/backward X 15 reps   Standing heel/toe raises HEP   Mini squat holds Red ball X 15 two squeezes with Red ball   TKE Black -band  15 reps  L LE   LAQ 20 reps  5 pound  B LE    Nautilus Leg Press # 60 x 10   Quad sets 20 reps  L LE  5 reps with overpressure   Heel slides 15 reps  L LE   SAQ x   Bridging 15 reps   Straight leg raise 20 reps  5 pound  L LE     Treatment/Session Summary:    · Response to Treatment:  Patient tolerated treatment well with improving overall mobility. Good Progress in PT. Added a few exercises today. · Communication/Consultation:  None today  · Equipment provided today:  None today  · Recommendations/Intent for next treatment session: Next visit will focus on improving overall mobility and pain with daily activities.     Total Treatment Billable Duration:  40 minutes  PT Patient Time In/Time Out  Time In: 0845  Time Out: 0930  [facility]     Visit # Therapist initials Date Arrived NS/ Cx < 24 hr >24 hr Cx Visit Comments   1  9/3/2021 X   60 Visits Approved   2 JS 9/7/2021 X      3 rjk 9/8/2021 X       4 rjk 9-13-21 X      5 rjk 9-15-21 x      6 rjk 9-20-21 x      7  9/24/2021 X       8 Js 9/29/2021 X       9 JS 10/1/2021 X    Progress Note   10  10/6/2021 X       11 JS 10/8/2021 X       12 JS 10/13/2021 X       13 JS 10/15/2021 X       14 rjk 10-18-21 x      15 rjk 10-20-21 x      15 JS 10/27/2021 X       16 JS 10/29/2021 X       17 JS 11/3/2021 X    Progress Note   18 JS 11-5-2021 x      19 rjk 11/10/2021 X       20 JS 11-12-21 x      21 rjk 11-16-21       22 rjk 11/22/2021 X         Abbreviations: NS = No Show; CX = cancelled     Future Appointments   Date Time Provider Kelton Orr   11/22/2021  8:45 AM Stuart Hancock PTA [facility]   12/3/2021  8:00 AM Jessica Rosales, PT [facility]FE   12/9/2021  8:00 AM Jessica Rosales, PT [facility]FE   12/16/2021  8:00 AM Jessica Rosales, PT [facility]FE   12/21/2021  1:45 PM Jessica Rosales, PT [facility]PT SFE   12/27/2021  1:45 PM Joshua Clay PTA [facility] SFE   12/29/2021  7:15 AM SFE [facility] ROOM 2 SFERMAM SFE   1/6/2022  2:30 PM MD VIVIEN Dia

## 2021-12-03 ENCOUNTER — HOSPITAL ENCOUNTER (OUTPATIENT)
Dept: PHYSICAL THERAPY | Age: 56
Discharge: HOME | End: 2021-12-03
Attending: ORTHOPAEDIC SURGERY
Payer: COMMERCIAL

## 2021-12-03 PROCEDURE — 97110 THERAPEUTIC EXERCISES: CPT

## 2021-12-03 NOTE — PROGRESS NOTES
Bere Lomas Flaspoehler  : 1965  Primary: Ottoniel Villarreal Ppo  Secondary:  [address]  at [address], [address] [place] [address] 83,[address], [address] [facility]  [phone]   [phone]        OUTPATIENT PHYSICAL THERAPY: Daily Treatment Note 12/3/2021  Visit Count:  24    ICD-10: Treatment Diagnosis:   · Pain in left knee (M25.562)  · Stiffness of left knee, not elsewhere classified (M25.662)  Precautions/Allergies:   Mobic [meloxicam]   TREATMENT PLAN:  Effective Dates: 2021 TO 3/3/2022 (90 days). Frequency/Duration: 2 times a week for 90 Day(s) MEDICAL/REFERRING DIAGNOSIS:  Pain in left knee [M25.562]   DATE OF ONSET: Chronic; Surgery: 2021  REFERRING PHYSICIAN: Tiffanie Chavez MD MD Orders:    8-12 weeks: Full weight bearing. Full ROM. Gradually increase to return to full activities at 12 weeks. Return MD Appointment: DREA     Pre-treatment Symptoms/Complaints:  12/3/2021: Patent reports she is still having some swelling in her knee during the day. She reports that she is also still having some pain around the lateral portion of her knee as well. Pain: Initial:   0/10 Post Session:  0/10   Medications Last Reviewed:  12/3/2021  Updated Objective Findings:  126 degrees flexion -1, with overpressure into extenion   TREATMENT:     THERAPEUTIC EXERCISE: (40 minutes):  Exercises per grid below to improve mobility, strength, balance and coordination. Required minimal visual, verbal and manual cues to promote proper body alignment and promote proper body posture. Progressed resistance, range, repetitions and complexity of movement as indicated.    Date:  12/3/2021   Activity/Exercise Parameters   Manual Hamstring Stretch with ankle pumps 20 reps   Airdyne 8 minutes   Seat #4   SLS Trampoline Toss Red ball  20 throws   Nautilus Leg Curl 45 pounds  20 reps   Standing hip flexion 15 reps  2 pound  L LE   Standing hip extension 15 reps   2 pound  L LE   Standing knee flexion 15 reps   2 pound  L LE   Theraband Side stepping Green t-band  2 laps   [facility] Green t-band  2 laps   Standing hip abduction 15 reps  2 pound  L LE   Step up laterally 6 inch step  12 reps   Step up 6 inch step  12 reps   Step downs 6 inch step  15 reps   Tiltboard: right/left, forward/backward 15 reps each   Standing heel/toe raises HEP   Mini squat holds Red ball X    TKE Black -band  15 reps  L LE   LAQ 20 reps  5 pound  B LE    Nautilus Leg Press 60 pounds  10 reps   Quad sets 20 reps  L LE  5 reps with overpressure   Heel slides 15 reps  L LE   SAQ x   Bridging 15 reps   Straight leg raise 20 reps  5 pound  L LE     Treatment/Session Summary:    · Response to Treatment:  Patient tolerated treatment well with improving overall mobility noted after treatment. · Communication/Consultation:  None today  · Equipment provided today:  None today  · Recommendations/Intent for next treatment session: Next visit will focus on improving overall mobility and pain with daily activities.     Total Treatment Billable Duration:  40 minutes  PT Patient Time In/Time Out  Time In: 0800  Time Out: [address] [employer] [facility], PT     Visit # Therapist initials Date Arrived NS/ Cx < 24 hr >24 hr Cx Visit Comments   1  9/3/2021 X   60 Visits Approved   2  9/7/2021 X      3 rjk 9/8/2021 X       4 rjk 9-13-21 X      5 rjk 9-15-21 x      6 rjk 9-20-21 x      7  9/24/2021 X       8  9/29/2021 X       9  10/1/2021 X    Progress Note   10  10/6/2021 X       11  10/8/2021 X       12  10/13/2021 X       13  10/15/2021 X       14 rjk 10-18-21 x      15 rjk 10-20-21 x      15  10/27/2021 X       16  10/29/2021 X       17  11/3/2021 X    Progress Note   18  11-5-2021 x      19 rjk 11/10/2021 X       20 JS 11-12-21 x      21 rjk 11-16-21       22 rjk 11/22/2021 X       23 JS 12/3/2021 X         Abbreviations: NS = No Show; CX = cancelled     Future Appointments   Date Time Provider Kelton Orr   12/3/2021  8:00 AM Fitzgerald Reap, PT [facility] SFE   12/9/2021  8:00 AM Fitzgerald Reap, PT [facility] SFE   12/16/2021  8:00 AM Fitzgerald Reap, PT [facility] SFE   12/21/2021  1:45 PM Fitzgerald Reap, PT [facility]PT SFE   12/27/2021  1:45 PM Tg Ahuja PTA [facility] SFE   12/29/2021  7:15 AM SFE [facility] ROOM 2 SFERMAM SFE   1/4/2022  3:00 PM MD VIVIEN Couch

## 2021-12-03 NOTE — THERAPY RECERTIFICATION
Kimber Avalos Flaspoehler  : 1965  Primary: Karime Mercadoadele Ppo  Secondary:  [address] at [place] [address] [place] [address], [place] [address].  [phone]   [phone]          OUTPATIENT PHYSICAL THERAPY:Recertification    ICD-10: Treatment Diagnosis:   · Pain in left knee (M25.562)  · Stiffness of left knee, not elsewhere classified (M25.662)  Precautions/Allergies:   Mobic [meloxicam]   TREATMENT PLAN:  Effective Dates: 2021 TO 3/3/2022 (90 days). Frequency/Duration: 2 times a week for 90 Day(s) MEDICAL/REFERRING DIAGNOSIS:  Pain in left knee [M25.562]   DATE OF ONSET: Chronic; Surgery: 2021  REFERRING PHYSICIAN: Carlita Harris MD MD Orders:   8-12 weeks: Full weight bearing. Full ROM. Gradually increase to return to full activities at 12 weeks. Return MD Appointment: TBD     ASSESSMENT:  Ms. Kenroy Cabrera presents with improving mobility, strength, and pain in left knee following recent left meniscal repair. Patient has attended a total of 24 scheduled physical therapy visits including initial evaluation on 9/3/2021. Treatment has consisted of mobility and strengthening activities to improve overall safety, mobility, and performance with activities of daily living. Patient is making excellent progress with all aspects of therapy at this time. After discussing with patient she agreed she would continue to benefit from physical therapy to improve overall mobility. Please sign this re-certification if you concur. Thank you for the opportunity to serve this patient. PROBLEM LIST (Impacting functional limitations):  1. Decreased Strength  2. Decreased ADL/Functional Activities  3. Decreased Ambulation Ability/Technique  4. Decreased Balance  5. Increased Pain  6. Decreased Activity Tolerance INTERVENTIONS PLANNED: (Treatment may consist of any combination of the following)  1. Balance Exercise  2. Cold  3. Electrical Stimulation  4.  Gait Training  5. Heat  6. Home Exercise Program (HEP)  7. Manual Therapy  8. Neuromuscular Re-education/Strengthening  9. Range of Motion (ROM)  10. Therapeutic Activites  11. Therapeutic Exercise/Strengthening  12. Transcutaneous Electrical Nerve Stimulation (TENS)     GOALS: (Goals have been discussed and agreed upon with patient.)  Short-Term Functional Goals: Time Frame: 30 days  1. Patient will be independent with home exercise program without exacerbation of symptoms or cueing needed--gaol met. 2. Patient will be independent with correct sleeping positions and awareness/avoidance of aggravating positions without cueing needed--goal met. Discharge Goals: Time Frame: 90 days  1. Patient will be independent with all ADLs with minimal onset of left knee pain and no deficits with daily tasks--goal met. 2. Patient will report no fear avoidance with social or recreational activities due to left knee pain --goal ongoing. 3. Patient will score greater than or equal to 60/80 on Lower Extremity Functional Scale with minimal effect of left knee pain on patient's ability to manage every day life activities--goal ongoing. OUTCOME MEASURE:   Tool Used: Lower Extremity Functional Scale (LEFS)  Score:  Initial: 12/80 Most Recent: 52/80 (Date: 12/3/2021 )   Interpretation of Score: 20 questions each scored on a 5 point scale with 0 representing \"extreme difficulty or unable to perform\" and 4 representing \"no difficulty\". The lower the score, the greater the functional disability. 80/80 represents no disability. Minimal detectable change is 9 points. MEDICAL NECESSITY:   · Patient is expected to demonstrate progress in strength, range of motion, balance and coordination to improve safety during daily activities. · Patient demonstrates good rehab potential due to higher previous functional level. · Skilled intervention continues to be required due to decreased functional mobility.   REASON FOR SERVICES/OTHER COMMENTS:  · Patient continues to demonstrate capacity to improve overall functional mobility which will increase independence and increase safety. Total Duration:  PT Patient Time In/Time Out  Time In: 0800  Time Out: 0845    Rehabilitation Potential For Stated Goals: Good   Regarding [employer] therapy, I certify that the treatment plan above will be carried out by a therapist or under their direction. Thank you for this referral,  Malachi Arevalo, PT     Referring Physician Signature: Son Galeano MD _______________________________ Date _____________      PAIN/SUBJECTIVE:   Initial:   0/10 Post Session:  0/10   HISTORY:   History of Injury/Illness (Reason for Referral):  Patient reports her surgery to repair the meniscus on her L LE was on Wednesday (9/1/2021). She reports she is doing really well. She report she has minimal pain in her knee. She reports she has some pressure and soreness from the surgery, but otherwise her knee feels better. She reports that she is doing well with her restrictions with only TTWB and the brace locked at 0 degrees. She reports that she is using the CPM to tolerance right now. She reports she is feeling good overall. She reports she is ready to get moving and feeling better so she can get back to work. 10/1/2021 (Progress Note): Patient reports she is doing well overall with no real pain. She reports she is stiff in the AM and so doing the CPM first thing does help to loosen the knee. She reports she is ready to get back to putting her full weight on her knee again so she can get back to work. 11/3/2021 (Progress Note): Patient reports she is doing well and her knee is feeling more normal all the time. 01/3/0809 (Recertification): Patient reports her knee is still swelling some, but she knows she is on it more during the day now than right after surgery.   She reports some discomfort along the lateral portion of her knee around the hamstring and/or IT band. Past Medical History/Comorbidities:   Ms. Lilliam Benítez  has a past medical history of Arthritis, Asthma, COVID-19 (10/2020), and H/O seasonal allergies. She also has no past medical history of Coagulation defects, COPD, Difficult intubation, Malignant hyperthermia due to anesthesia, Nausea & vomiting, or Pseudocholinesterase deficiency. Ms. Lilliam Benítez  has a past surgical history that includes hx cholecystectomy; hx dilation and curettage (11/2010); hx hysterectomy (5/2011); and hx other surgical (Right, 2019). Social History/Living Environment:   Home Environment: Private residence  Living Alone: No  Support Systems: Family member(s), Friends \ neighbors, Spouse/Significant Other/Partner  Prior Level of Function/Work/Activity:  Independent  Dominant Side:         RIGHT  Personal Factors:          Sex:  female        Age:  61 y.o. Current Medications:       Current Outpatient Medications:     senna-docusate (PERICOLACE) 8.6-50 mg per tablet, Take 1 Tablet by mouth daily. To start after surgery  Indications: constipation (Patient not taking: Reported on 9/1/2021), Disp: 21 Tablet, Rfl: 0    ondansetron (ZOFRAN ODT) 4 mg disintegrating tablet, 1 Tablet by SubLINGual route every six (6) hours as needed for Nausea or Nausea or Vomiting. To start after surgery  Indications: prevent nausea and vomiting after surgery (Patient not taking: Reported on 9/1/2021), Disp: 20 Tablet, Rfl: 0    celecoxib (CELEBREX) 100 mg capsule, Take 1 Cap by mouth daily. , Disp: 90 Cap, Rfl: 3    loratadine (CLARITIN) 10 mg tablet, Take 10 mg by mouth daily. Instructed to take DOS per anesthesia guidelines , Disp: , Rfl:     multivitamin capsule, Take 1 Cap by mouth daily. , Disp: , Rfl:    Date Last Reviewed:  12/3/2021    EXAMINATION:   Observation/Orthostatic Postural Assessment:          Posture Assessment: Rounded shoulders, Forward head   Palpation:          Tenderness to palpation noted along posterior aspect of left knee.  Minimal swelling. No bruising noted. ROM:          L LE Assessment (AROM):   · Hip Flexion: 90 degrees    · Hip Extension: 10 degrees    · Hip Abduction: 15 degrees    · Hip Adduction: 0 degrees    · Hip Internal rotation: Not tested     · Hip External rotation: Not tested    · Knee Flexion: 126 degrees    · Knee Extension: 1 degrees     Strength:          L LE Assessment (Strength):   · Hip Flexion: 4/5 with manual muscle testing    · Hip Extension: 4/5 with manual muscle testing    · Hip Abduction: 4/5 with manual muscle testing    · Hip Adduction: 4/5 with manual muscle testing    · Knee Flexion: 4-/5 with manual muscle testing    · Knee Extension: 4-/5 with manual muscle testing     Special Tests:          Negative  Neurological Screen:        Dermatomes: Within normal limits        Reflexes:  2+  Functional Mobility:   Gait/Mobility:    Base of Support: Within normal limits  Speed/Charleen: Within normal limits  Step Length:  Within normal limits  Swing Pattern: Left symmetrical, Right symmetrical  Gait Abnormalities: Within normal limits  Ambulation - Level of Assistance: Independent  Assistive Device: None  · Interventions: Verbal cues, Safety awareness training          Transfers:     Sit to Stand: Independent  Stand to Sit: Independent  Stand Pivot Transfers: Independent  Bed to Chair: Independent  Lateral Transfers: Independent         Bed Mobility:     Rolling: Independent  Supine to Sit: Independent  Sit to Supine: Independent  Scooting: Independent

## 2021-12-08 PROBLEM — M76.899 PES ANSERINUS TENDINITIS AND BURSITIS: Status: ACTIVE | Noted: 2021-12-08

## 2021-12-09 ENCOUNTER — HOSPITAL ENCOUNTER (OUTPATIENT)
Dept: PHYSICAL THERAPY | Age: 56
Discharge: HOME | End: 2021-12-09
Attending: ORTHOPAEDIC SURGERY
Payer: COMMERCIAL

## 2021-12-09 PROCEDURE — 97140 MANUAL THERAPY 1/> REGIONS: CPT

## 2021-12-09 PROCEDURE — 97110 THERAPEUTIC EXERCISES: CPT

## 2021-12-09 PROCEDURE — 97035 APP MDLTY 1+ULTRASOUND EA 15: CPT

## 2021-12-09 NOTE — PROGRESS NOTES
Gregg Saeed [place]  : 1965  Primary: Bahman Estrada Ppo  Secondary:  [address] at [place] [id number] [facility], [address] [place] [address],[address], [place] [address].  [phone]   [phone]        OUTPATIENT PHYSICAL THERAPY: Daily Treatment Note 2021  Visit Count:  25    ICD-10: Treatment Diagnosis:   · Pain in left knee (M25.562)  · Stiffness of left knee, not elsewhere classified (M25.662)  Precautions/Allergies:   Mobic [meloxicam]   TREATMENT PLAN:  Effective Dates: 2021 TO 3/3/2022 (90 days). Frequency/Duration: 2 times a week for 90 Day(s) MEDICAL/REFERRING DIAGNOSIS:  Pain in left knee [M25.562]   DATE OF ONSET: Chronic; Surgery: 2021  REFERRING PHYSICIAN: Lorrie Lenz MD MD Orders:    8-12 weeks: Full weight bearing. Full ROM. Gradually increase to return to full activities at 12 weeks. Return MD Appointment: TBD     Pre-treatment Symptoms/Complaints:  2021: Patent reports her knee is hurting. She reports she tried to push one of the chairs upstairs with someone in it and her knee started hurting. Pain: Initial:   0/10 Post Session:  0/10   Medications Last Reviewed:  2021  Updated Objective Findings:  126 degrees flexion -1, with overpressure into extenion   TREATMENT:     THERAPEUTIC EXERCISE: (10 minutes):  Exercises per grid below to improve mobility, strength, balance and coordination. Required minimal visual, verbal and manual cues to promote proper body alignment and promote proper body posture. Progressed resistance, range, repetitions and complexity of movement as indicated.    Date:  2021   Activity/Exercise Parameters   Manual Hamstring Stretch with ankle pumps N/A today:  20 reps   Airdyne 8 minutes   Seat #4   SLS Trampoline Toss N/A today:  Red ball  20 throws   Nautilus Leg Curl N/A today:  45 pounds  20 reps   Standing hip flexion N/A today:  15 reps  2 pound  L LE   Standing hip extension N/A today:  15 reps   2 pound  L LE Standing knee flexion N/A today:  15 reps   2 pound  L LE   Theraband Side stepping N/A today:  Green t-band  2 laps   Monster Walk N/A today:  Green t-band  2 laps   Standing hip abduction N/A today:  15 reps  2 pound  L LE   Step up laterally N/A today:  6 inch step  12 reps   Step up N/A today:  6 inch step  12 reps   Step downs N/A today:  6 inch step  15 reps   Tiltboard: right/left, forward/backward N/A today:  15 reps each   Standing heel/toe raises HEP   Mini squat holds Red ball X    TKE N/A today:  Black -band  15 reps  L LE   LAQ N/A today:  20 reps  5 pound  B LE    Nautilus Leg Press N/A today:  60 pounds  10 reps   Quad sets N/A today:  20 reps  L LE  5 reps with overpressure   Heel slides N/A today:  15 reps  L LE   SAQ x   Bridging N/A today:  15 reps   Straight leg raise N/A today:  20 reps  5 pound  L LE     MANUAL THERAPY: (20 minutes): Gentle joint and soft tissue mobilization to left knee (medial)  was utilized and necessary because of the patient's restricted joint motion, painful spasm, loss of articular motion and restricted motion of soft tissue. MODALITIES: (10 minutes): *  Ultrasound was used today secondary to the patient having tightened structures limiting joint motion that require an increase in extensibility. Ultrasound was used today to reduce pain and reduce spasm.  (left knee - patient in supine with bolster under knee)    Treatment/Session Summary:    · Response to Treatment:  Patient tolerated treatment well with improving overall mobility noted after treatment. · Communication/Consultation:  None today  · Equipment provided today:  None today  · Recommendations/Intent for next treatment session: Next visit will focus on improving overall mobility and pain with daily activities.     Total Treatment Billable Duration:  40 minutes  PT Patient Time In/Time Out  Time In: 0800  Time Out: [facility], PT     Visit # Therapist initials Date Arrived NS/ Cx < 24 hr >24 hr Cx Visit Comments   1  9/3/2021 X   60 Visits Approved   2  9/7/2021 X      3 rjk 9/8/2021 X       4 rjk 9-13-21 X      5 rjk 9-15-21 x      6 rjk 9-20-21 x      7 JS 9/24/2021 X       8  9/29/2021 X       9  10/1/2021 X    Progress Note   10  10/6/2021 X       11  10/8/2021 X       12  10/13/2021 X       13  10/15/2021 X       14 rjk 10-18-21 x      15 rjk 10-20-21 x      15  10/27/2021 X       16  10/29/2021 X       17  11/3/2021 X    Progress Note   18  11-5-2021 x      19 rjk 11/10/2021 X       20 JS 11-12-21 x      21 rjk 11-16-21       22 rjk 11/22/2021 X       23  12/3/2021 X       24  12/9/2021 X                                             Abbreviations: NS = No Show; CX = cancelled     Future Appointments   Date Time Provider [facility]   12/9/2021  8:00 AM Antony Francisco, PT [facility]   12/16/2021  8:00 AM Antony Francisco, PT [facility]   12/21/2021  1:45 PM Antony Francisco, PT [facility]   12/27/2021  1:45 PM Ranjit Ahuja, PTA [facility]   12/29/2021  7:15 AM [facility] TREVIN  RO[address] [facility]   1/4/2022  3:00 PM MD VIVIEN Valentino

## 2021-12-16 ENCOUNTER — HOSPITAL ENCOUNTER (OUTPATIENT)
Dept: PHYSICAL THERAPY | Age: 56
Discharge: HOME | End: 2021-12-16
Attending: ORTHOPAEDIC SURGERY
Payer: COMMERCIAL

## 2021-12-16 PROCEDURE — 97110 THERAPEUTIC EXERCISES: CPT

## 2021-12-16 PROCEDURE — 97140 MANUAL THERAPY 1/> REGIONS: CPT

## 2021-12-16 NOTE — PROGRESS NOTES
Roselia Rebolledo [place]  : 1965  Primary: Shanna Anne Ppo  Secondary:  [address] at [address] [facility] [address] [place] [address], [facility] [address]  [phone]   [phone]          OUTPATIENT PHYSICAL THERAPY:Progress Report 2021   ICD-10: Treatment Diagnosis:   · Pain in left knee (M25.562)  · Stiffness of left knee, not elsewhere classified (M25.662)  Precautions/Allergies:   Mobic [meloxicam]   TREATMENT PLAN:  Effective Dates: 9/3/2021 TO 2021 (90 days). Frequency/Duration: 2 times a week for 90 Day(s) MEDICAL/REFERRING DIAGNOSIS:  Pain in left knee [M25.562]   DATE OF ONSET: Chronic; Surgery: 2021  REFERRING PHYSICIAN: Helen Zavaleta MD MD Orders:   0-6 weeks: 1st week: TDWB; After 1st week: Luke Marinas extension(no weight bearing with knee flexed past 90º) with crutches0-90º; Immobilize in full extension at night for 1st week. Heel slides, quad sets, straight leg raises. 6-8 weeks: Wean from crutches. Full ROM. No weight bearing at flexion >90º. Continue previous exercises. Stretching and strengthening. Closed chain exercises (not past 90º). 8-12 weeks: Full weight bearing. Full ROM. Gradually increase to return to full activities at 12 weeks. Return MD Appointment: TBD     ASSESSMENT:  Ms. Waldemar Harp presents with improving mobility, strength, and pain in left knee following recent left meniscal repair. Patient has attended a total of 26 scheduled physical therapy visits including initial evaluation on 9/3/2021. Treatment has consisted of mobility and strengthening activities to improve overall safety, mobility, and performance with activities of daily living. Patient is making excellent progress with all aspects of therapy at this time. PROBLEM LIST (Impacting functional limitations):  1. Decreased Strength  2. Decreased ADL/Functional Activities  3. Decreased Ambulation Ability/Technique  4. Decreased Balance  5.  Increased Pain  6. Decreased Activity Tolerance INTERVENTIONS PLANNED: (Treatment may consist of any combination of the following)  1. Balance Exercise  2. Cold  3. Electrical Stimulation  4. Gait Training  5. Heat  6. Home Exercise Program (HEP)  7. Manual Therapy  8. Neuromuscular Re-education/Strengthening  9. Range of Motion (ROM)  10. Therapeutic Activites  11. Therapeutic Exercise/Strengthening  12. Transcutaneous Electrical Nerve Stimulation (TENS)     GOALS: (Goals have been discussed and agreed upon with patient.)  Short-Term Functional Goals: Time Frame: 30 days  1. Patient will be independent with home exercise program without exacerbation of symptoms or cueing needed--gaol met. 2. Patient will be independent with correct sleeping positions and awareness/avoidance of aggravating positions without cueing needed--goal met. Discharge Goals: Time Frame: 90 days  1. Patient will be independent with all ADLs with minimal onset of left knee pain and no deficits with daily tasks--goal met. 2. Patient will report no fear avoidance with social or recreational activities due to left knee pain --goal ongoing. 3. Patient will score greater than or equal to 60/80 on Lower Extremity Functional Scale with minimal effect of left knee pain on patient's ability to manage every day life activities--goal ongoing. OUTCOME MEASURE:   Tool Used: Lower Extremity Functional Scale (LEFS)  Score:  Initial: 12/80 Most Recent: 562/80 (Date: 12/16/2021 )   Interpretation of Score: 20 questions each scored on a 5 point scale with 0 representing \"extreme difficulty or unable to perform\" and 4 representing \"no difficulty\". The lower the score, the greater the functional disability. 80/80 represents no disability. Minimal detectable change is 9 points. MEDICAL NECESSITY:   · Patient is expected to demonstrate progress in strength, range of motion, balance and coordination to improve safety during daily activities.   · Patient demonstrates good rehab potential due to higher previous functional level. · Skilled intervention continues to be required due to decreased functional mobility. REASON FOR SERVICES/OTHER COMMENTS:  · Patient continues to demonstrate capacity to improve overall functional mobility which will increase independence and increase safety. Total Duration:  PT Patient Time In/Time Out  Time In: 0800  Time Out: 0845    Rehabilitation Potential For Stated Goals: Good     PAIN/SUBJECTIVE:   Initial:   0/10 Post Session:  0/10   HISTORY:   History of Injury/Illness (Reason for Referral):  Patient reports her surgery to repair the meniscus on her L LE was on Wednesday (9/1/2021). She reports she is doing really well. She report she has minimal pain in her knee. She reports she has some pressure and soreness from the surgery, but otherwise her knee feels better. She reports that she is doing well with her restrictions with only TTWB and the brace locked at 0 degrees. She reports that she is using the CPM to tolerance right now. She reports she is feeling good overall. She reports she is ready to get moving and feeling better so she can get back to work. 10/1/2021 (Progress Note): Patient reports she is doing well overall with no real pain. She reports she is stiff in the AM and so doing the CPM first thing does help to loosen the knee. She reports she is ready to get back to putting her full weight on her knee again so she can get back to work. 11/3/2021 (Progress Note): Patient reports she is doing well and her knee is feeling more normal all the time. 12/16/2021 (Progress Note): Patient reports that she is feeling much better overall. Past Medical History/Comorbidities:   Ms. Magy Lemon  has a past medical history of Arthritis, Asthma, COVID-19 (10/2020), and H/O seasonal allergies.     She has no past medical history of Coagulation defects, COPD, Difficult intubation, Malignant hyperthermia due to anesthesia, Nausea & vomiting, or Pseudocholinesterase deficiency. Ms. Albert Schmidt  has a past surgical history that includes hx cholecystectomy; hx dilation and curettage (11/2010); hx hysterectomy (5/2011); and hx other surgical (Right, 2019). Social History/Living Environment:   Home Environment: Private residence  Living Alone: No  Support Systems: Family member(s), Friends \ neighbors, Spouse/Significant Other/Partner  Prior Level of Function/Work/Activity:  Independent  Dominant Side:         RIGHT  Personal Factors:          Sex:  female        Age:  61 y.o. Current Medications:       Current Outpatient Medications:     senna-docusate (PERICOLACE) 8.6-50 mg per tablet, Take 1 Tablet by mouth daily. To start after surgery  Indications: constipation (Patient not taking: Reported on 9/1/2021), Disp: 21 Tablet, Rfl: 0    ondansetron (ZOFRAN ODT) 4 mg disintegrating tablet, 1 Tablet by SubLINGual route every six (6) hours as needed for Nausea or Nausea or Vomiting. To start after surgery  Indications: prevent nausea and vomiting after surgery (Patient not taking: Reported on 9/1/2021), Disp: 20 Tablet, Rfl: 0    celecoxib (CELEBREX) 100 mg capsule, Take 1 Cap by mouth daily. , Disp: 90 Cap, Rfl: 3    loratadine (CLARITIN) 10 mg tablet, Take 10 mg by mouth daily. Instructed to take DOS per anesthesia guidelines , Disp: , Rfl:     multivitamin capsule, Take 1 Cap by mouth daily. , Disp: , Rfl:    Date Last Reviewed:  12/16/2021    EXAMINATION:   Observation/Orthostatic Postural Assessment:          Posture Assessment: Rounded shoulders, Forward head   Palpation:          Tenderness to palpation noted along posterior aspect of left knee. Minimal swelling. No bruising noted.     ROM:          L LE Assessment (AROM):   · Hip Flexion: 90 degrees    · Hip Extension: 10 degrees    · Hip Abduction: 15 degrees    · Hip Adduction: 0 degrees    · Hip Internal rotation: Not tested     · Hip External rotation: Not tested    · Knee Flexion: 120 degrees    · Knee Extension: 2 degrees     Strength:          L LE Assessment (Strength):   · Hip Flexion: 4/5 with manual muscle testing    · Hip Extension: 4/5 with manual muscle testing    · Hip Abduction: 4/5 with manual muscle testing    · Hip Adduction: 4/5 with manual muscle testing    · Knee Flexion: 4/5 with manual muscle testing    · Knee Extension: 4/5 with manual muscle testing     Special Tests:          Negative  Neurological Screen:        Dermatomes: Within normal limits        Reflexes:  2+  Functional Mobility:   Gait/Mobility:    Base of Support: Within normal limits  Speed/Charleen: Within normal limits  Step Length:  Within normal limits  Swing Pattern: Left symmetrical, Right symmetrical  Gait Abnormalities: Within normal limits  Ambulation - Level of Assistance: Independent  Assistive Device: None  · Interventions: Verbal cues, Safety awareness training          Transfers:     Sit to Stand: Independent  Stand to Sit: Independent  Stand Pivot Transfers: Independent  Bed to Chair: Independent  Lateral Transfers: Independent         Bed Mobility:     Rolling: Independent  Supine to Sit: Independent  Sit to Supine: Independent  Scooting: Independent

## 2021-12-16 NOTE — PROGRESS NOTES
Traci Snyder Flaspoehler  : 1965  Primary: Yuriy Simmons Ppo  Secondary:  [employer]  at [address], [address] [place] [address],[address], [address] [facility]  [phone]   [phone]        OUTPATIENT PHYSICAL THERAPY: Daily Treatment Note 2021  Visit Count:  26    ICD-10: Treatment Diagnosis:   · Pain in left knee (M25.562)  · Stiffness of left knee, not elsewhere classified (M25.662)  Precautions/Allergies:   Mobic [meloxicam]   TREATMENT PLAN:  Effective Dates: 2021 TO 3/3/2022 (90 days). Frequency/Duration: 2 times a week for 90 Day(s) MEDICAL/REFERRING DIAGNOSIS:  Pain in left knee [M25.562]   DATE OF ONSET: Chronic; Surgery: 2021  REFERRING PHYSICIAN: Ayala Johnson MD MD Orders:    8-12 weeks: Full weight bearing. Full ROM. Gradually increase to return to full activities at 12 weeks. Return MD Appointment: DREA     Pre-treatment Symptoms/Complaints:  2021: Patent reports she is doing much better than her last visit. She reports minimal soreness in her knee. Pain: Initial:   3/10 Post Session:  3/10   Medications Last Reviewed:  2021  Updated Objective Findings:  126 degrees flexion -1, with overpressure into extenion   TREATMENT:     THERAPEUTIC EXERCISE: (30 minutes):  Exercises per grid below to improve mobility, strength, balance and coordination. Required minimal visual, verbal and manual cues to promote proper body alignment and promote proper body posture. Progressed resistance, range, repetitions and complexity of movement as indicated.     Date:  2021   Activity/Exercise Parameters   Manual Hamstring Stretch with ankle pumps 3 reps   Airdyne 8 minutes   Seat #4   SLS Trampoline Toss N/A today:  Red ball  20 throws   Nautilus Leg Curl N/A today:  45 pounds  20 reps   Standing hip flexion 15 reps  2 pound  L LE   Standing hip extension 15 reps   2 pound  L LE   Standing knee flexion 15 reps   2 pound  L LE   Theraband Side stepping Green t-band  2 laps   Keywee t-band  2 laps   Standing hip abduction 15 reps  2 pound  L LE   Step up laterally N/A today:  6 inch step  12 reps   Step up 6 inch step  12 reps   Step downs 6 inch step  15 reps   Tiltboard: right/left, forward/backward N/A today:  15 reps each   TKE Black -band  15 reps  L LE   LAQ 20 reps  5 pound  B LE    Nautilus Leg Press N/A today:  60 pounds  10 reps   Quad sets 20 reps  L LE  5 reps with overpressure   Heel slides 15 reps  L LE   SAQ 15 reps   2 pound  L LE   Bridging 15 reps   Straight leg raise 20 reps  5 pound  L LE     MANUAL THERAPY: (10 minutes): Gentle joint and soft tissue mobilization to left knee (medial)  was utilized and necessary because of the patient's restricted joint motion, painful spasm, loss of articular motion and restricted motion of soft tissue. MODALITIES: (0 minutes): *  Ultrasound was used today secondary to the patient having tightened structures limiting joint motion that require an increase in extensibility. Ultrasound was used today to reduce pain and reduce spasm.   (left knee - patient in supine with bolster under knee)    Treatment/Session Summary:    · Response to Treatment:  Patient tolerated treatment well with improving overall mobility noted after treatment. · Communication/Consultation:  None today  · Equipment provided today:  None today  · Recommendations/Intent for next treatment session: Next visit will focus on improving overall mobility and pain with daily activities.     Total Treatment Billable Duration:  40 minutes  PT Patient Time In/Time Out  Time In: 0800  Time Out: [address], PT     Visit # Therapist initials Date Arrived NS/ Cx < 24 hr >24 hr Cx Visit Comments   1 JS 9/3/2021 X   60 Visits Approved   2 JS 9/7/2021 X      3 gonzalo 9/8/2021 X       4 rjmonserrat 9-13-21 X      5 rjk 9-15-21 x      6 rjk 9-20-21 x      7 JS 9/24/2021 X       8 Js 9/29/2021 X       9 JS 10/1/2021 X    Progress Note 10  10/6/2021 X       11  10/8/2021 X       12  10/13/2021 X       13  10/15/2021 X       14 [facility] 10-18-21 x      15 [facility] 10-20-21 x      15  10/27/2021 X       16  10/29/2021 X       17  11/3/2021 X    Progress Note   18  11-5-2021 x      19 rjk 11/10/2021 X       20  11-12-21 x      21 [facility] 11-16-21       22 rj 11/22/2021 X       23  12/3/2021 X       24  12/9/2021 X       25  12/16/2021 X    Progress Note                                Abbreviations: NS = No Show; CX = cancelled     Future Appointments   Date Time Provider [facility]   12/16/2021  8:00 AM Clarise Sartorius, PT [facility]   12/21/2021  1:45 PM Clarise Sartorius, PT [facility]   12/27/2021  1:45 PM Noris Netarturo, PTA [facility]   12/29/2021  7:15 AM SFE [facility] ROOM 2 [facility]   1/4/2022  3:00 PM MD VIVIEN Mcfarland

## 2021-12-21 ENCOUNTER — HOSPITAL ENCOUNTER (OUTPATIENT)
Dept: PHYSICAL THERAPY | Age: 56
Discharge: HOME | End: 2021-12-21
Attending: ORTHOPAEDIC SURGERY
Payer: COMMERCIAL

## 2021-12-21 PROCEDURE — 97110 THERAPEUTIC EXERCISES: CPT

## 2021-12-21 PROCEDURE — 97140 MANUAL THERAPY 1/> REGIONS: CPT

## 2021-12-21 NOTE — PROGRESS NOTES
Santos Turner Flaspoehler  : 1965  Primary: Hilda Nelson Ppo  Secondary:  [address] at [place] [id number] [facility], [address] [place] [address],[address], [facility] [address]  [phone]   [phone]        OUTPATIENT PHYSICAL THERAPY: Daily Treatment Note 2021  Visit Count:  27    ICD-10: Treatment Diagnosis:   · Pain in left knee (M25.562)  · Stiffness of left knee, not elsewhere classified (M25.662)  Precautions/Allergies:   Mobic [meloxicam]   TREATMENT PLAN:  Effective Dates: 2021 TO 3/3/2022 (90 days). Frequency/Duration: 2 times a week for 90 Day(s) MEDICAL/REFERRING DIAGNOSIS:  Pain in left knee [M25.562]   DATE OF ONSET: Chronic; Surgery: 2021  REFERRING PHYSICIAN: Tyesha Norwood MD MD Orders:    8-12 weeks: Full weight bearing. Full ROM. Gradually increase to return to full activities at 12 weeks. Return MD Appointment: TBD     Pre-treatment Symptoms/Complaints:  2021: Patent reports she is doing so much better. She reports minimal swelling and hardly any swelling. She reports her knee is starting to feel like it belongs to her again. Pain: Initial:   3/10 Post Session:  3/10   Medications Last Reviewed:  2021  Updated Objective Findings:  126 degrees flexion -1, with overpressure into extenion   TREATMENT:     THERAPEUTIC EXERCISE: (30 minutes):  Exercises per grid below to improve mobility, strength, balance and coordination. Required minimal visual, verbal and manual cues to promote proper body alignment and promote proper body posture. Progressed resistance, range, repetitions and complexity of movement as indicated.     Date:  2021   Activity/Exercise Parameters   Manual Hamstring Stretch with ankle pumps X    Airdyne 8 minutes   Seat #4   SLS Trampoline Toss N/A today:  Red ball  20 throws   Nautilus Leg Curl 45 pounds  20 reps   Standing hip flexion 15 reps  2 pound  L LE   Standing hip extension 15 reps   2 pound  L LE   Standing knee flexion 15 reps   2 pound  L LE   Theraband Side stepping X    Monster Walk Green t-band  2 laps   Standing hip abduction 15 reps  2 pound  L LE   Step up laterally N/A today:  6 inch step  12 reps   Step up 6 inch step  12 reps   Step downs 6 inch step  15 reps   Tiltboard: right/left, forward/backward N/A today:  15 reps each   TKE Black -band  15 reps  L LE   LAQ 20 reps  5 pound  B LE    Nautilus Leg Press 60 pounds  10 reps   Quad sets 20 reps  L LE  5 reps with overpressure   Heel slides 15 reps  L LE   SAQ 15 reps   2 pound  L LE   Bridging 15 reps   Straight leg raise 20 reps  5 pound  L LE     MANUAL THERAPY: (10 minutes): Gentle joint and soft tissue mobilization to left knee (medial)  was utilized and necessary because of the patient's restricted joint motion, painful spasm, loss of articular motion and restricted motion of soft tissue. MODALITIES: (0 minutes): *  Ultrasound was used today secondary to the patient having tightened structures limiting joint motion that require an increase in extensibility. Ultrasound was used today to reduce pain and reduce spasm.   (left knee - patient in supine with bolster under knee)    Treatment/Session Summary:    · Response to Treatment:  Patient tolerated treatment well with improving overall mobility. · Communication/Consultation:  None today  · Equipment provided today:  None today  · Recommendations/Intent for next treatment session: Next visit will focus on improving overall mobility and pain with daily activities.     Total Treatment Billable Duration:  40 minutes  PT Patient Time In/Time Out  Time In: 1345  Time Out: ELMO Shankar     Visit # Therapist initials Date Arrived NS/ Cx < 24 hr >24 hr Cx Visit Comments   1 ELADIO 9/3/2021 X   60 Visits Approved   2 ELADIO 9/7/2021 X      3 gonzalo 9/8/2021 X       4 shaek 9-13-21 X      5 shaek 9-15-21 x      6 rjk 9-20-21 x      7 JS 9/24/2021 X       8 Js 9/29/2021 X       9 ELADIO 10/1/2021 X    Progress Note   10 ELADIO 10/6/2021 X       11 JS 10/8/2021 X       12 JS 10/13/2021 X       13 JS 10/15/2021 X       14 rjk 10-18-21 x      15 rjk 10-20-21 x      15 JS 10/27/2021 X       16 JS 10/29/2021 X       17 JS 11/3/2021 X    Progress Note   18 JS 11-5-2021 x      19 rjk 11/10/2021 X       20 JS 11-12-21 x      21 rjk 11-16-21       22 rjk 11/22/2021 X       23 JS 12/3/2021 X       24 JS 12/9/2021 X       25 JS 12/16/2021 X    Progress Note   26 JS 12/21/2021 X                           Abbreviations: NS = No Show; CX = cancelled     Future Appointments   Date Time Provider [place] Orr   12/21/2021  1:45 PM Erica Tinsley PT [facility]   12/27/2021  1:45 PM Zay Lomax PTA [facility]   12/29/2021  7:15 AM SFE [facility] ROOM 2 [facility]   1/4/2022  3:00 PM MD VIVIEN Pak

## 2021-12-29 ENCOUNTER — HOSPITAL ENCOUNTER (OUTPATIENT)
Dept: MAMMOGRAPHY | Age: 56
Discharge: HOME | End: 2021-12-29
Attending: INTERNAL MEDICINE
Payer: COMMERCIAL

## 2021-12-29 DIAGNOSIS — Z12.31 SCREENING MAMMOGRAM FOR HIGH-RISK PATIENT: ICD-10-CM

## 2021-12-29 PROCEDURE — 77067 SCR MAMMO BI INCL CAD: CPT

## 2022-01-13 NOTE — THERAPY DISCHARGE
Ayush Burton Flaspoehler  : 1965  Primary: Valorie Lindo Ppo  Secondary:  [address] at [place] [address] [facility], [address] [place] [address],[address], [place] [address].  [phone]   [phone]          OUTPATIENT PHYSICAL THERAPY:Discharge Summary 2021   ICD-10: Treatment Diagnosis:   · Pain in left knee (M25.562)  · Stiffness of left knee, not elsewhere classified (M25.662)  Precautions/Allergies:   Mobic [meloxicam]   TREATMENT PLAN:  Effective Dates: 9/3/2021 TO 2021 (90 days). Frequency/Duration: 2 times a week for 90 Day(s) MEDICAL/REFERRING DIAGNOSIS:  Pain in left knee [M25.562]   DATE OF ONSET: Chronic; Surgery: 2021  REFERRING PHYSICIAN: Fiona Higgins MD MD Orders:   0-6 weeks: 1st week: TDWB; After 1st week: Gordy Hals extension(no weight bearing with knee flexed past 90º) with crutches0-90º; Immobilize in full extension at night for 1st week. Heel slides, quad sets, straight leg raises. 6-8 weeks: Wean from crutches. Full ROM. No weight bearing at flexion >90º. Continue previous exercises. Stretching and strengthening. Closed chain exercises (not past 90º). 8-12 weeks: Full weight bearing. Full ROM. Gradually increase to return to full activities at 12 weeks. Return MD Appointment: TBD     ASSESSMENT:  Ms. Constantino Astudillo presents with improved mobility, strength, and pain in left knee following recent left meniscal repair. Patient attended a total of 27 scheduled physical therapy visits including initial evaluation on 9/3/2021. Treatment consisted of mobility and strengthening activities to improve overall safety, mobility, and performance with activities of daily living. Patient made excellent progress with all aspects of therapy. Patient will not attend any additional physical therapy visits secondary to having met all goals. Patient will be discharged at this time.   We will be happy to re-assess her with a change in her status and a new order from her doctor. Thank you for the opportunity to serve this patient. PROBLEM LIST (Impacting functional limitations):  1. Decreased Strength  2. Decreased ADL/Functional Activities  3. Decreased Ambulation Ability/Technique  4. Decreased Balance  5. Increased Pain  6. Decreased Activity Tolerance INTERVENTIONS PLANNED: (Treatment may consist of any combination of the following)  1. Balance Exercise  2. Cold  3. Electrical Stimulation  4. Gait Training  5. Heat  6. Home Exercise Program (HEP)  7. Manual Therapy  8. Neuromuscular Re-education/Strengthening  9. Range of Motion (ROM)  10. Therapeutic Activites  11. Therapeutic Exercise/Strengthening  12. Transcutaneous Electrical Nerve Stimulation (TENS)     GOALS: (Goals have been discussed and agreed upon with patient.)  Short-Term Functional Goals: Time Frame: 30 days  1. Patient will be independent with home exercise program without exacerbation of symptoms or cueing needed--gaol met. 2. Patient will be independent with correct sleeping positions and awareness/avoidance of aggravating positions without cueing needed--goal met. Discharge Goals: Time Frame: 90 days  1. Patient will be independent with all ADLs with minimal onset of left knee pain and no deficits with daily tasks--goal met. 2. Patient will report no fear avoidance with social or recreational activities due to left knee pain --goal met. 3. Patient will score greater than or equal to 60/80 on Lower Extremity Functional Scale with minimal effect of left knee pain on patient's ability to manage every day life activities--goal met. OUTCOME MEASURE:   Tool Used: Lower Extremity Functional Scale (LEFS)  Score:  Initial: 12/80 Most Recent: 60/80 (Date: 12/16/2021 )   Interpretation of Score: 20 questions each scored on a 5 point scale with 0 representing \"extreme difficulty or unable to perform\" and 4 representing \"no difficulty\". The lower the score, the greater the functional disability.  80/80 represents no disability. Minimal detectable change is 9 points. EXAMINATION:   Observation/Orthostatic Postural Assessment:          Posture Assessment: Rounded shoulders, Forward head   Palpation:          Tenderness to palpation noted along posterior aspect of left knee. Minimal swelling. No bruising noted. ROM:          L LE Assessment (AROM):   · Hip Flexion: 90 degrees    · Hip Extension: 10 degrees    · Hip Abduction: 15 degrees    · Hip Adduction: 0 degrees    · Hip Internal rotation: Not tested     · Hip External rotation: Not tested    · Knee Flexion: 120 degrees    · Knee Extension: 2 degrees     Strength:          L LE Assessment (Strength):   · Hip Flexion: 4/5 with manual muscle testing    · Hip Extension: 4/5 with manual muscle testing    · Hip Abduction: 4/5 with manual muscle testing    · Hip Adduction: 4/5 with manual muscle testing    · Knee Flexion: 4/5 with manual muscle testing    · Knee Extension: 4/5 with manual muscle testing     Special Tests:          Negative  Neurological Screen:        Dermatomes: Within normal limits        Reflexes:  2+  Functional Mobility:   Gait/Mobility:    Base of Support: Within normal limits  Speed/Charleen: Within normal limits  Step Length:  Within normal limits  Swing Pattern: Left symmetrical, Right symmetrical  Gait Abnormalities: Within normal limits  Ambulation - Level of Assistance: Independent  Assistive Device: None  · Interventions: Verbal cues, Safety awareness training          Transfers:     Sit to Stand: Independent  Stand to Sit: Independent  Stand Pivot Transfers: Independent  Bed to Chair: Independent  Lateral Transfers: Independent         Bed Mobility:     Rolling: Independent  Supine to Sit: Independent  Sit to Supine: Independent  Scooting: Independent

## 2022-03-19 PROBLEM — R10.13 EPIGASTRIC PAIN: Status: ACTIVE | Noted: 2018-07-04

## 2022-03-19 PROBLEM — M76.899 PES ANSERINUS TENDINITIS AND BURSITIS: Status: ACTIVE | Noted: 2021-12-08

## 2022-03-19 PROBLEM — R79.82 CRP ELEVATED: Status: ACTIVE | Noted: 2018-07-04

## 2022-03-19 PROBLEM — K57.12 DIVERTICULITIS OF JEJUNUM: Status: ACTIVE | Noted: 2018-07-04

## 2022-03-19 PROBLEM — K29.00 ACUTE GASTRITIS WITHOUT HEMORRHAGE: Status: ACTIVE | Noted: 2018-07-04

## 2022-03-20 PROBLEM — S83.242A TEAR OF MEDIAL MENISCUS OF LEFT KNEE, CURRENT: Status: ACTIVE | Noted: 2021-09-01

## 2022-12-02 ENCOUNTER — OFFICE VISIT (OUTPATIENT)
Dept: OCCUPATIONAL MEDICINE | Age: 57
End: 2022-12-02

## 2022-12-02 VITALS
HEART RATE: 80 BPM | OXYGEN SATURATION: 97 % | RESPIRATION RATE: 16 BRPM | DIASTOLIC BLOOD PRESSURE: 86 MMHG | SYSTOLIC BLOOD PRESSURE: 133 MMHG | TEMPERATURE: 98 F

## 2022-12-02 DIAGNOSIS — B00.1 HERPES LABIALIS WITHOUT COMPLICATION: Primary | ICD-10-CM

## 2022-12-02 RX ORDER — VALACYCLOVIR HYDROCHLORIDE 1 G/1
2000 TABLET, FILM COATED ORAL 2 TIMES DAILY
Qty: 4 TABLET | Refills: 0 | Status: SHIPPED | OUTPATIENT
Start: 2022-12-02 | End: 2022-12-03

## 2022-12-02 ASSESSMENT — ENCOUNTER SYMPTOMS
STRIDOR: 0
COUGH: 0
SINUS PAIN: 0
SORE THROAT: 0
SWOLLEN GLANDS: 0
RESPIRATORY NEGATIVE: 1
WHEEZING: 0
GASTROINTESTINAL NEGATIVE: 1
RHINORRHEA: 0
SINUS PRESSURE: 0
EYES NEGATIVE: 1

## 2022-12-02 NOTE — PROGRESS NOTES
Subjective:      Patient ID: Sherice Vieira is a 61 y.o. female. Mouth Lesions   The current episode started yesterday. The onset was sudden. The problem has been gradually worsening. Current severity: Tried Cold sore cream but did not provide any relief. Associated symptoms include mouth sores. Pertinent negatives include no fever, no congestion, no ear pain, no headaches, no rhinorrhea, no sore throat, no stridor, no swollen glands, no cough, no URI and no wheezing. Associated symptoms comments: Cold sore left side lower lip, last cold sore 8/2021. Review of Systems   Constitutional:  Negative for chills, fatigue and fever. HENT:  Positive for mouth sores. Negative for congestion, ear pain, rhinorrhea, sinus pressure, sinus pain and sore throat. Cold sore lower lip left side   Eyes: Negative. Respiratory: Negative. Negative for cough, wheezing and stridor. Cardiovascular: Negative. Gastrointestinal: Negative. Musculoskeletal:  Negative for myalgias. Neurological:  Negative for dizziness and headaches. Allergies   Allergen Reactions    Meloxicam Other (See Comments)     Chest pain      Current Outpatient Medications on File Prior to Visit   Medication Sig Dispense Refill    Multiple Vitamin (MULTIVITAMIN ADULT PO) Take by mouth      celecoxib (CELEBREX) 100 MG capsule Take 100 mg by mouth daily      loratadine (CLARITIN) 10 MG tablet Take 10 mg by mouth daily      ondansetron (ZOFRAN-ODT) 4 MG disintegrating tablet Place 4 mg under the tongue every 6 hours as needed (Patient not taking: Reported on 12/2/2022)      senna-docusate (PERICOLACE) 8.6-50 MG per tablet Take 1 tablet by mouth daily (Patient not taking: Reported on 12/2/2022)       No current facility-administered medications on file prior to visit. Objective:   Physical Exam  Vitals reviewed. Constitutional:       General: She is not in acute distress. Appearance: Normal appearance.  She is not ill-appearing. HENT:      Head: Normocephalic. Mouth/Throat:      Lips: Pink. Lesions present. Cardiovascular:      Rate and Rhythm: Normal rate. Pulmonary:      Effort: Pulmonary effort is normal. No respiratory distress. Skin:     General: Skin is warm and dry. Neurological:      General: No focal deficit present. Mental Status: She is alert and oriented to person, place, and time. Psychiatric:         Mood and Affect: Mood normal.         Behavior: Behavior normal.         Thought Content: Thought content normal.         Judgment: Judgment normal.     Vitals:    12/02/22 0735   BP: 133/86   Pulse: 80   Resp: 16   Temp: 98 °F (36.7 °C)   SpO2: 97%        Assessment:       Diagnosis Orders   1. Herpes labialis without complication               Plan:      P:    * Rxs provided: Valtrex 1 gram take 2 tablets BID x 1 day, #4, NR  Recommendations:   Keep area clean  Avoid reusing lipsticks or chapsticks if used when had cold sore  F/U in the clinic if not resolving in 5-7 days  Labs performed/ordered:  Educational information:  Educational material reviewed and provided at checkout re:_____  Follow up: Patient was encouraged to return to the clinic and/or PCP if s/s persist or do not resolve completely. Also advised to seek emergent care if worsening signs and symptoms warrant immediate evaluation including, but not limited to HA, blurred vision, speech disturbance, difficulty with ambulation/gait, numbness, tingling, weakness, syncope, abdominal pain, chest pain, or shortness of breath. *Side effects, adverse effects, risks versus benefits associated with medications prescribed/recommended were discussed with the patient. Patient verbalized understanding. All questions answered.       * I have reviewed the patient's medication list, past medical, family, social, and surgical    history and updated the patient record appropriately      Social History     Socioeconomic History    Marital status:      Spouse name: Not on file    Number of children: Not on file    Years of education: Not on file    Highest education level: Not on file   Occupational History    Not on file   Tobacco Use    Smoking status: Never    Smokeless tobacco: Never   Substance and Sexual Activity    Alcohol use: Yes     Alcohol/week: 1.0 - 2.0 standard drink    Drug use: No    Sexual activity: Not on file   Other Topics Concern    Not on file   Social History Narrative    Not on file     Social Determinants of Health     Financial Resource Strain: Not on file   Food Insecurity: Not on file   Transportation Needs: Not on file   Physical Activity: Not on file   Stress: Not on file   Social Connections: Not on file   Intimate Partner Violence: Not on file   Housing Stability: Not on file     Past Medical History:   Diagnosis Date    Arthritis     Asthma     no exacerbations since before 2008    COVID-19 10/2020    H/O seasonal allergies      Past Surgical History:   Procedure Laterality Date    CHOLECYSTECTOMY      DILATION AND CURETTAGE OF UTERUS  11/2010    HYSTERECTOMY (CERVIX STATUS UNKNOWN)  5/2011    No BSO    KNEE ARTHROSCOPY W/ MENISCECTOMY Left 09/2021    OTHER SURGICAL HISTORY Left 2019    I&D for septic joint right index finger     Family History   Problem Relation Age of Onset    Hypertension Mother     Anemia Mother     Peripheral Vascular Disease Mother     Aortic Stenosis Mother     High Cholesterol Father     Hypertension Father     Coronary Art Dis Brother     Heart Disease Brother     Heart Attack Brother         MI at 28    Cancer Maternal Grandmother         Colon    Breast Cancer Paternal Grandmother 61    Cancer Paternal Grandmother         Breast    Stroke Paternal Caye Larose.  Charles Haley CNP

## 2022-12-30 ENCOUNTER — HOSPITAL ENCOUNTER (OUTPATIENT)
Dept: MAMMOGRAPHY | Age: 57
Discharge: HOME | End: 2022-12-30
Payer: COMMERCIAL

## 2022-12-30 DIAGNOSIS — Z12.31 BREAST CANCER SCREENING BY MAMMOGRAM: ICD-10-CM

## 2022-12-30 PROCEDURE — 77067 SCR MAMMO BI INCL CAD: CPT

## 2023-01-09 ENCOUNTER — APPOINTMENT (RX ONLY)
Dept: URBAN - METROPOLITAN AREA CLINIC 329 | Facility: CLINIC | Age: 58
Setting detail: DERMATOLOGY
End: 2023-01-09

## 2023-01-09 DIAGNOSIS — L82.1 OTHER SEBORRHEIC KERATOSIS: ICD-10-CM

## 2023-01-09 DIAGNOSIS — D22 MELANOCYTIC NEVI: ICD-10-CM

## 2023-01-09 DIAGNOSIS — L57.8 OTHER SKIN CHANGES DUE TO CHRONIC EXPOSURE TO NONIONIZING RADIATION: ICD-10-CM

## 2023-01-09 DIAGNOSIS — L57.0 ACTINIC KERATOSIS: ICD-10-CM

## 2023-01-09 DIAGNOSIS — L71.8 OTHER ROSACEA: ICD-10-CM

## 2023-01-09 PROBLEM — D22.5 MELANOCYTIC NEVI OF TRUNK: Status: ACTIVE | Noted: 2023-01-09

## 2023-01-09 PROBLEM — L30.9 DERMATITIS, UNSPECIFIED: Status: ACTIVE | Noted: 2023-01-09

## 2023-01-09 PROBLEM — D48.5 NEOPLASM OF UNCERTAIN BEHAVIOR OF SKIN: Status: ACTIVE | Noted: 2023-01-09

## 2023-01-09 PROCEDURE — 17000 DESTRUCT PREMALG LESION: CPT | Mod: 59

## 2023-01-09 PROCEDURE — ? LIQUID NITROGEN

## 2023-01-09 PROCEDURE — 17003 DESTRUCT PREMALG LES 2-14: CPT

## 2023-01-09 PROCEDURE — 99204 OFFICE O/P NEW MOD 45 MIN: CPT | Mod: 25

## 2023-01-09 PROCEDURE — ? PRESCRIPTION

## 2023-01-09 PROCEDURE — ? SUNSCREEN RECOMMENDATIONS

## 2023-01-09 PROCEDURE — ? BIOPSY BY SHAVE METHOD

## 2023-01-09 PROCEDURE — ? FULL BODY SKIN EXAM - DECLINED

## 2023-01-09 PROCEDURE — ? COUNSELING

## 2023-01-09 PROCEDURE — 11103 TANGNTL BX SKIN EA SEP/ADDL: CPT

## 2023-01-09 PROCEDURE — ? ORDER TESTS

## 2023-01-09 PROCEDURE — 11102 TANGNTL BX SKIN SINGLE LES: CPT

## 2023-01-09 RX ORDER — OXYMETAZOLINE HYDROCHLORIDE 1 G/100G
CREAM TOPICAL
Qty: 30 | Refills: 3 | Status: ERX | COMMUNITY
Start: 2023-01-09

## 2023-01-09 RX ORDER — IVERMECTIN 10 MG/G
CREAM TOPICAL
Qty: 45 | Refills: 3 | Status: ERX | COMMUNITY
Start: 2023-01-09

## 2023-01-09 RX ADMIN — IVERMECTIN: 10 CREAM TOPICAL at 00:00

## 2023-01-09 RX ADMIN — OXYMETAZOLINE HYDROCHLORIDE: 1 CREAM TOPICAL at 00:00

## 2023-01-09 ASSESSMENT — LOCATION ZONE DERM
LOCATION ZONE: FACE
LOCATION ZONE: ARM
LOCATION ZONE: TRUNK
LOCATION ZONE: HAND

## 2023-01-09 ASSESSMENT — LOCATION SIMPLE DESCRIPTION DERM
LOCATION SIMPLE: LEFT CHEEK
LOCATION SIMPLE: LEFT CLAVICULAR SKIN
LOCATION SIMPLE: RIGHT HAND
LOCATION SIMPLE: LEFT HAND
LOCATION SIMPLE: RIGHT UPPER BACK
LOCATION SIMPLE: RIGHT CHEEK
LOCATION SIMPLE: LEFT FOREARM

## 2023-01-09 ASSESSMENT — LOCATION DETAILED DESCRIPTION DERM
LOCATION DETAILED: LEFT RADIAL DORSAL HAND
LOCATION DETAILED: RIGHT SUPERIOR MEDIAL UPPER BACK
LOCATION DETAILED: LEFT DISTAL DORSAL FOREARM
LOCATION DETAILED: LEFT DORSAL MIDDLE METACARPOPHALANGEAL JOINT
LOCATION DETAILED: LEFT LATERAL MALAR CHEEK
LOCATION DETAILED: LEFT MEDIAL MALAR CHEEK
LOCATION DETAILED: RIGHT ULNAR DORSAL HAND
LOCATION DETAILED: RIGHT CENTRAL MALAR CHEEK
LOCATION DETAILED: LEFT CLAVICULAR SKIN
LOCATION DETAILED: RIGHT RADIAL DORSAL HAND
LOCATION DETAILED: LEFT CENTRAL MALAR CHEEK
LOCATION DETAILED: LEFT PROXIMAL DORSAL FOREARM

## 2023-01-09 NOTE — PROCEDURE: LIQUID NITROGEN
Duration Of Freeze Thaw-Cycle (Seconds): 0
Post-Care Instructions: I reviewed with the patient in detail post-care instructions. Patient is to wear sunprotection, and avoid picking at any of the treated lesions. Pt may apply Vaseline to crusted or scabbing areas.
Detail Level: Detailed
Render Note In Bullet Format When Appropriate: No
Consent: The patient's consent was obtained including but not limited to risks of crusting, scabbing, blistering, scarring, darker or lighter pigmentary change, recurrence, incomplete removal and infection.
Show Aperture Variable?: Yes

## 2023-01-09 NOTE — PROCEDURE: MIPS QUALITY
Quality 110: Preventive Care And Screening: Influenza Immunization: Influenza Immunization Administered during Influenza season
Quality 431: Preventive Care And Screening: Unhealthy Alcohol Use - Screening: Patient not identified as an unhealthy alcohol user when screened for unhealthy alcohol use using a systematic screening method
Quality 130: Documentation Of Current Medications In The Medical Record: Current Medications Documented
Detail Level: Zone
Quality 226: Preventive Care And Screening: Tobacco Use: Screening And Cessation Intervention: Patient screened for tobacco use and is an ex/non-smoker

## 2023-01-09 NOTE — PROCEDURE: ORDER TESTS
Performing Laboratory: -132
Expected Date Of Service: 01/09/2023
Bill For Surgical Tray: no
Billing Type: Third-Party Bill

## 2023-01-09 NOTE — HPI: RASH
Is The Patient Presenting As Previously Scheduled?: Yes
How Severe Is Your Rash?: mild
Is This A New Presentation, Or A Follow-Up?: Rash
Additional History: Sensitive skin lotion.

## 2023-01-30 ENCOUNTER — APPOINTMENT (RX ONLY)
Dept: URBAN - METROPOLITAN AREA CLINIC 329 | Facility: CLINIC | Age: 58
Setting detail: DERMATOLOGY
End: 2023-01-30

## 2023-01-30 DIAGNOSIS — L82.0 INFLAMED SEBORRHEIC KERATOSIS: ICD-10-CM

## 2023-01-30 DIAGNOSIS — L91.8 OTHER HYPERTROPHIC DISORDERS OF THE SKIN: ICD-10-CM

## 2023-01-30 PROBLEM — C44.612 BASAL CELL CARCINOMA OF SKIN OF RIGHT UPPER LIMB, INCLUDING SHOULDER: Status: ACTIVE | Noted: 2023-01-30

## 2023-01-30 PROCEDURE — 17110 DESTRUCTION B9 LES UP TO 14: CPT | Mod: 59

## 2023-01-30 PROCEDURE — 17262 DSTRJ MAL LES T/A/L 1.1-2.0: CPT

## 2023-01-30 PROCEDURE — ? COUNSELING

## 2023-01-30 PROCEDURE — ? LIQUID NITROGEN

## 2023-01-30 PROCEDURE — ? SKIN TAG REMOVAL (COSMETIC)

## 2023-01-30 PROCEDURE — ? FULL BODY SKIN EXAM - DECLINED

## 2023-01-30 PROCEDURE — ? CURETTAGE AND DESTRUCTION

## 2023-01-30 ASSESSMENT — LOCATION ZONE DERM
LOCATION ZONE: TRUNK
LOCATION ZONE: FACE

## 2023-01-30 ASSESSMENT — LOCATION DETAILED DESCRIPTION DERM
LOCATION DETAILED: LEFT RIB CAGE
LOCATION DETAILED: SUBMENTAL CHIN

## 2023-01-30 ASSESSMENT — LOCATION SIMPLE DESCRIPTION DERM
LOCATION SIMPLE: ABDOMEN
LOCATION SIMPLE: SUBMENTAL CHIN

## 2023-01-30 NOTE — PROCEDURE: SKIN TAG REMOVAL (COSMETIC)
Anesthesia Volume In Cc: 3
Detail Level: Detailed
Price (Use Numbers Only, No Special Characters Or $): 75
Consent: Written consent obtained and the risks of skin tag removal was reviewed with the patient including but not limited to bleeding, pigmentary change, infection, pain, and remote possibility of scarring.
Anesthesia Type: 1% lidocaine with epinephrine
Removed With: gradle excision

## 2023-09-01 ENCOUNTER — OFFICE VISIT (OUTPATIENT)
Dept: ORTHOPEDIC SURGERY | Age: 58
End: 2023-09-01

## 2023-09-01 DIAGNOSIS — M67.441 DIGITAL MUCOUS CYST OF FINGER OF RIGHT HAND: ICD-10-CM

## 2023-09-01 DIAGNOSIS — M15.1 DEGENERATIVE ARTHRITIS OF DISTAL INTERPHALANGEAL JOINT OF MIDDLE FINGER OF RIGHT HAND: Primary | ICD-10-CM

## 2023-09-01 NOTE — H&P (VIEW-ONLY)
Orthopaedic Hand Clinic Note    Name: Manpreet Coker  Age: 60 y.o. YOB: 1965  Gender: female  MRN: [id number]      Follow up visit:   1. 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 Digital mucous cyst of finger of right hand        HPI: Manpreet Coker is a 60 y.o. 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 She has a new mucous cyst on the right middle finger, this is severely painful. ROS/Meds/PSH/PMH/FH/SH: I personally reviewed the patients standard intake form. Pertinents are discussed in the HPI    Physical Examination:  General: Awake and alert. HEENT: Normocephalic, atraumatic  CV/Pulm: Breathing even and unlabored  Skin: No obvious rashes noted. Lymphatic: No obvious evidence of lymphedema or lymphadenopathy    Musculoskeletal Examination:  Examination on the right upper extremity demonstrates cap refill < 5 seconds in all fingers, 3 mucocyst are palpated on the right middle finger on the dorsal aspect of the DIP joint, the largest 1 is on the radial side of the DIP joint, the second largest on the ulnar side and there is a smaller 1 on the radial side of the nail germinal matrix which the patient said had drained before, the skin is very thin over the radial-sided mucous cyst, there is severe tenderness palpation of the DIP joint and very limited motion, 10 to 35 degrees at most.    Imaging / Electrodiagnostic Tests:     right Hand XR: AP, Lateral, Oblique and Thumb CMC joint     Clinical Indication:  1. 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  Digital mucous cyst of finger of right hand           Report: AP, lateral, oblique

## 2023-09-01 NOTE — PROGRESS NOTES
Orthopaedic Hand Clinic Note    Name: Dariusz Case  Age: 60 y.o. YOB: 1965  Gender: female  MRN: [id number]      Follow up visit:   1. 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 Digital mucous cyst of finger of right hand        HPI: Dariusz Case is a 60 y.o. 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 She has a new mucous cyst on the right middle finger, this is severely painful. ROS/Meds/PSH/PMH/FH/SH: I personally reviewed the patients standard intake form. Pertinents are discussed in the HPI    Physical Examination:  General: Awake and alert. HEENT: Normocephalic, atraumatic  CV/Pulm: Breathing even and unlabored  Skin: No obvious rashes noted. Lymphatic: No obvious evidence of lymphedema or lymphadenopathy    Musculoskeletal Examination:  Examination on the right upper extremity demonstrates cap refill < 5 seconds in all fingers, 3 mucocyst are palpated on the right middle finger on the dorsal aspect of the DIP joint, the largest 1 is on the radial side of the DIP joint, the second largest on the ulnar side and there is a smaller 1 on the radial side of the nail germinal matrix which the patient said had drained before, the skin is very thin over the radial-sided mucous cyst, there is severe tenderness palpation of the DIP joint and very limited motion, 10 to 35 degrees at most.    Imaging / Electrodiagnostic Tests:     right Hand XR: AP, Lateral, Oblique and Thumb CMC joint     Clinical Indication:  1. 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  Digital mucous cyst of finger of right hand           Report: AP, lateral, oblique

## 2023-09-05 DIAGNOSIS — M15.1 DEGENERATIVE ARTHRITIS OF DISTAL INTERPHALANGEAL JOINT OF MIDDLE FINGER OF RIGHT HAND: Primary | ICD-10-CM

## 2023-09-05 NOTE — PRE-PROCEDURE INSTRUCTIONS
Patient verified name and . Order for consent was not found in EHR ; patient verifies procedure. Type IB surgery, phone assessment complete. Orders were not received. Labs per surgeon: none received for PAT at this time. Labs per anesthesia protocol: none. Patient answered medical/surgical history questions at their best of ability. All prior to admission medications documented in EPIC. Patient instructed to take the following medications the day of surgery according to anesthesia guidelines with a small sip of water: none On the day before surgery please take Tylenol (Acetaminophen) 1000mg in the morning and then again before bed. You may substitute for Tylenol 650 mg. Hold all vitamins 7 days prior to surgery and NSAIDS 5 days prior to surgery. Prescription meds to hold:Loratadine. Patient instructed on the following:Instructions sent to patient's my chart per her request.    > Arrive at Outpatient Entrance, time of arrival to be called the day before by 1700  > NPO after midnight, unless otherwise indicated, including gum, mints, and ice chips  > Responsible adult must drive patient to the hospital, stay during surgery, and patient will need supervision 24 hours after anesthesia  > Use antibacterial soap in shower the night before surgery and on the morning of surgery  > All piercings must be removed prior to arrival.    > Leave all valuables (money and jewelry) at home but bring insurance card and ID on DOS.   > You may be required to pay a deductible or co-pay on the day of your procedure. You can pre-pay by calling [phone] if your surgery is at the [facility] or [phone] if your surgery is at the [facility]. > Do not wear make-up, nail polish, lotions, cologne, perfumes, powders, or oil on skin. Artificial nails are not permitted.

## 2023-09-06 ENCOUNTER — ANESTHESIA EVENT (OUTPATIENT)
Dept: SURGERY | Age: 58
End: 2023-09-06
Payer: COMMERCIAL

## 2023-09-06 DIAGNOSIS — M15.1 DEGENERATIVE ARTHRITIS OF DISTAL INTERPHALANGEAL JOINT OF MIDDLE FINGER OF RIGHT HAND: ICD-10-CM

## 2023-09-06 DIAGNOSIS — M67.441 DIGITAL MUCOUS CYST OF FINGER OF RIGHT HAND: Primary | ICD-10-CM

## 2023-09-06 NOTE — PERIOP NOTE
Preop department called to notify patient of arrival time for scheduled procedure. Instructions given to   - Arrive at [address] [facility]. - Remain NPO after midnight, unless otherwise indicated, including gum, mints, and ice chips. - Have a responsible adult to drive patient to the hospital, stay during surgery, and patient will need supervision 24 hours after anesthesia. - Use antibacterial soap in shower the night before surgery and on the morning of surgery.        Was patient contacted: yes  Voicemail left:   Numbers contacted: [phone]   Arrival time: 8730

## 2023-09-07 ENCOUNTER — HOSPITAL ENCOUNTER (OUTPATIENT)
Age: 58
Setting detail: OUTPATIENT SURGERY
Discharge: HOME | End: 2023-09-07
Attending: ORTHOPAEDIC SURGERY | Admitting: ORTHOPAEDIC SURGERY
Payer: COMMERCIAL

## 2023-09-07 ENCOUNTER — ANESTHESIA (OUTPATIENT)
Dept: SURGERY | Age: 58
End: 2023-09-07
Payer: COMMERCIAL

## 2023-09-07 ENCOUNTER — APPOINTMENT (OUTPATIENT)
Dept: GENERAL RADIOLOGY | Age: 58
End: 2023-09-07
Attending: ORTHOPAEDIC SURGERY
Payer: COMMERCIAL

## 2023-09-07 VITALS
DIASTOLIC BLOOD PRESSURE: 84 MMHG | WEIGHT: 193 LBS | OXYGEN SATURATION: 96 % | BODY MASS INDEX: 30.29 KG/M2 | SYSTOLIC BLOOD PRESSURE: 127 MMHG | HEART RATE: 72 BPM | TEMPERATURE: 98.8 F | RESPIRATION RATE: 18 BRPM | HEIGHT: 67 IN

## 2023-09-07 DIAGNOSIS — M15.1 DEGENERATIVE ARTHRITIS OF DISTAL INTERPHALANGEAL JOINT OF MIDDLE FINGER OF RIGHT HAND: ICD-10-CM

## 2023-09-07 PROCEDURE — 3700000001 HC ADD 15 MINUTES (ANESTHESIA): Performed by: ORTHOPAEDIC SURGERY

## 2023-09-07 PROCEDURE — 3600000015 HC SURGERY LEVEL 5 ADDTL 15MIN: Performed by: ORTHOPAEDIC SURGERY

## 2023-09-07 PROCEDURE — 6370000000 HC RX 637 (ALT 250 FOR IP): Performed by: ANESTHESIOLOGY

## 2023-09-07 PROCEDURE — 7100000010 HC PHASE II RECOVERY - FIRST 15 MIN: Performed by: ORTHOPAEDIC SURGERY

## 2023-09-07 PROCEDURE — 6360000002 HC RX W HCPCS: Performed by: ORTHOPAEDIC SURGERY

## 2023-09-07 PROCEDURE — 7100000000 HC PACU RECOVERY - FIRST 15 MIN: Performed by: ORTHOPAEDIC SURGERY

## 2023-09-07 PROCEDURE — C1769 GUIDE WIRE: HCPCS | Performed by: ORTHOPAEDIC SURGERY

## 2023-09-07 PROCEDURE — 3700000000 HC ANESTHESIA ATTENDED CARE: Performed by: ORTHOPAEDIC SURGERY

## 2023-09-07 PROCEDURE — 2580000003 HC RX 258: Performed by: ANESTHESIOLOGY

## 2023-09-07 PROCEDURE — 6360000002 HC RX W HCPCS: Performed by: NURSE PRACTITIONER

## 2023-09-07 PROCEDURE — 3600000005 HC SURGERY LEVEL 5 BASE: Performed by: ORTHOPAEDIC SURGERY

## 2023-09-07 PROCEDURE — C1713 ANCHOR/SCREW BN/BN,TIS/BN: HCPCS | Performed by: ORTHOPAEDIC SURGERY

## 2023-09-07 PROCEDURE — 2720000010 HC SURG SUPPLY STERILE: Performed by: ORTHOPAEDIC SURGERY

## 2023-09-07 PROCEDURE — 2709999900 HC NON-CHARGEABLE SUPPLY: Performed by: ORTHOPAEDIC SURGERY

## 2023-09-07 PROCEDURE — 2500000003 HC RX 250 WO HCPCS: Performed by: ORTHOPAEDIC SURGERY

## 2023-09-07 PROCEDURE — 6360000002 HC RX W HCPCS

## 2023-09-07 PROCEDURE — 7100000001 HC PACU RECOVERY - ADDTL 15 MIN: Performed by: ORTHOPAEDIC SURGERY

## 2023-09-07 DEVICE — IMPLANTABLE DEVICE: Type: IMPLANTABLE DEVICE | Site: MIDDLE FINGER | Status: FUNCTIONAL

## 2023-09-07 RX ORDER — HALOPERIDOL 5 MG/ML
1 INJECTION INTRAMUSCULAR
Status: DISCONTINUED | OUTPATIENT
Start: 2023-09-07 | End: 2023-09-07 | Stop reason: HOSPADM

## 2023-09-07 RX ORDER — DEXTROSE MONOHYDRATE 100 MG/ML
INJECTION, SOLUTION INTRAVENOUS CONTINUOUS PRN
Status: DISCONTINUED | OUTPATIENT
Start: 2023-09-07 | End: 2023-09-07 | Stop reason: HOSPADM

## 2023-09-07 RX ORDER — SODIUM CHLORIDE 0.9 % (FLUSH) 0.9 %
5-40 SYRINGE (ML) INJECTION EVERY 12 HOURS SCHEDULED
Status: DISCONTINUED | OUTPATIENT
Start: 2023-09-07 | End: 2023-09-07 | Stop reason: HOSPADM

## 2023-09-07 RX ORDER — OXYCODONE HYDROCHLORIDE 5 MG/1
5 TABLET ORAL
Status: DISCONTINUED | OUTPATIENT
Start: 2023-09-07 | End: 2023-09-07 | Stop reason: HOSPADM

## 2023-09-07 RX ORDER — MIDAZOLAM HYDROCHLORIDE 2 MG/2ML
2 INJECTION, SOLUTION INTRAMUSCULAR; INTRAVENOUS
Status: DISCONTINUED | OUTPATIENT
Start: 2023-09-07 | End: 2023-09-07 | Stop reason: HOSPADM

## 2023-09-07 RX ORDER — ONDANSETRON 4 MG/1
4 TABLET, FILM COATED ORAL EVERY 8 HOURS PRN
Qty: 20 TABLET | Refills: 0 | Status: SHIPPED | OUTPATIENT
Start: 2023-09-07

## 2023-09-07 RX ORDER — SODIUM CHLORIDE, SODIUM LACTATE, POTASSIUM CHLORIDE, CALCIUM CHLORIDE 600; 310; 30; 20 MG/100ML; MG/100ML; MG/100ML; MG/100ML
INJECTION, SOLUTION INTRAVENOUS CONTINUOUS
Status: DISCONTINUED | OUTPATIENT
Start: 2023-09-07 | End: 2023-09-07 | Stop reason: HOSPADM

## 2023-09-07 RX ORDER — PROPOFOL 10 MG/ML
INJECTION, EMULSION INTRAVENOUS PRN
Status: DISCONTINUED | OUTPATIENT
Start: 2023-09-07 | End: 2023-09-07 | Stop reason: SDUPTHER

## 2023-09-07 RX ORDER — HYDROMORPHONE HYDROCHLORIDE 2 MG/ML
0.25 INJECTION, SOLUTION INTRAMUSCULAR; INTRAVENOUS; SUBCUTANEOUS EVERY 5 MIN PRN
Status: DISCONTINUED | OUTPATIENT
Start: 2023-09-07 | End: 2023-09-07 | Stop reason: HOSPADM

## 2023-09-07 RX ORDER — FENTANYL CITRATE 50 UG/ML
INJECTION, SOLUTION INTRAMUSCULAR; INTRAVENOUS PRN
Status: DISCONTINUED | OUTPATIENT
Start: 2023-09-07 | End: 2023-09-07 | Stop reason: SDUPTHER

## 2023-09-07 RX ORDER — SODIUM CHLORIDE 0.9 % (FLUSH) 0.9 %
5-40 SYRINGE (ML) INJECTION PRN
Status: DISCONTINUED | OUTPATIENT
Start: 2023-09-07 | End: 2023-09-07 | Stop reason: HOSPADM

## 2023-09-07 RX ORDER — SODIUM CHLORIDE 9 MG/ML
INJECTION, SOLUTION INTRAVENOUS PRN
Status: DISCONTINUED | OUTPATIENT
Start: 2023-09-07 | End: 2023-09-07 | Stop reason: HOSPADM

## 2023-09-07 RX ORDER — PROPOFOL 10 MG/ML
INJECTION, EMULSION INTRAVENOUS CONTINUOUS PRN
Status: DISCONTINUED | OUTPATIENT
Start: 2023-09-07 | End: 2023-09-07 | Stop reason: SDUPTHER

## 2023-09-07 RX ORDER — LIDOCAINE HYDROCHLORIDE 10 MG/ML
INJECTION, SOLUTION INFILTRATION; PERINEURAL PRN
Status: DISCONTINUED | OUTPATIENT
Start: 2023-09-07 | End: 2023-09-07 | Stop reason: ALTCHOICE

## 2023-09-07 RX ORDER — LIDOCAINE HYDROCHLORIDE 10 MG/ML
1 INJECTION, SOLUTION INFILTRATION; PERINEURAL
Status: DISCONTINUED | OUTPATIENT
Start: 2023-09-07 | End: 2023-09-07 | Stop reason: HOSPADM

## 2023-09-07 RX ORDER — OXYCODONE HYDROCHLORIDE 5 MG/1
5 TABLET ORAL EVERY 6 HOURS PRN
Qty: 20 TABLET | Refills: 0 | Status: SHIPPED | OUTPATIENT
Start: 2023-09-07 | End: 2023-09-12

## 2023-09-07 RX ORDER — ACETAMINOPHEN 500 MG
1000 TABLET ORAL ONCE
Status: COMPLETED | OUTPATIENT
Start: 2023-09-07 | End: 2023-09-07

## 2023-09-07 RX ORDER — BUPIVACAINE HYDROCHLORIDE 2.5 MG/ML
INJECTION, SOLUTION EPIDURAL; INFILTRATION; INTRACAUDAL PRN
Status: DISCONTINUED | OUTPATIENT
Start: 2023-09-07 | End: 2023-09-07 | Stop reason: ALTCHOICE

## 2023-09-07 RX ORDER — ONDANSETRON 2 MG/ML
4 INJECTION INTRAMUSCULAR; INTRAVENOUS
Status: DISCONTINUED | OUTPATIENT
Start: 2023-09-07 | End: 2023-09-07 | Stop reason: HOSPADM

## 2023-09-07 RX ORDER — MIDAZOLAM HYDROCHLORIDE 1 MG/ML
INJECTION INTRAMUSCULAR; INTRAVENOUS PRN
Status: DISCONTINUED | OUTPATIENT
Start: 2023-09-07 | End: 2023-09-07 | Stop reason: SDUPTHER

## 2023-09-07 RX ADMIN — Medication 2000 MG: at 10:59

## 2023-09-07 RX ADMIN — PROPOFOL 50 MG: 10 INJECTION, EMULSION INTRAVENOUS at 10:56

## 2023-09-07 RX ADMIN — PROPOFOL 160 MCG/KG/MIN: 10 INJECTION, EMULSION INTRAVENOUS at 10:56

## 2023-09-07 RX ADMIN — FENTANYL CITRATE 50 MCG: 50 INJECTION, SOLUTION INTRAMUSCULAR; INTRAVENOUS at 10:56

## 2023-09-07 RX ADMIN — MIDAZOLAM 2 MG: 1 INJECTION INTRAMUSCULAR; INTRAVENOUS at 10:51

## 2023-09-07 RX ADMIN — ACETAMINOPHEN 1000 MG: 500 TABLET, FILM COATED ORAL at 09:14

## 2023-09-07 RX ADMIN — FENTANYL CITRATE 25 MCG: 50 INJECTION, SOLUTION INTRAMUSCULAR; INTRAVENOUS at 11:00

## 2023-09-07 RX ADMIN — SODIUM CHLORIDE, POTASSIUM CHLORIDE, SODIUM LACTATE AND CALCIUM CHLORIDE: 600; 310; 30; 20 INJECTION, SOLUTION INTRAVENOUS at 09:13

## 2023-09-07 RX ADMIN — FENTANYL CITRATE 25 MCG: 50 INJECTION, SOLUTION INTRAMUSCULAR; INTRAVENOUS at 11:03

## 2023-09-07 RX ADMIN — SODIUM CHLORIDE, POTASSIUM CHLORIDE, SODIUM LACTATE AND CALCIUM CHLORIDE: 600; 310; 30; 20 INJECTION, SOLUTION INTRAVENOUS at 09:14

## 2023-09-07 ASSESSMENT — PAIN SCALES - GENERAL
PAINLEVEL_OUTOF10: 0

## 2023-09-07 NOTE — ANESTHESIA POSTPROCEDURE EVALUATION
Department of Anesthesiology  Postprocedure Note    Patient: Nadeem Lott  MRN: [id number]  YOB: 1965  Date of evaluation: 9/7/2023      Procedure Summary     Date: 09/07/23 Room / Location: 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 Complications: no  Cardiovascular status: acceptable.   Respiratory status: acceptable  Hydration status: stable  Pain management: adequate

## 2023-09-07 NOTE — INTERVAL H&P NOTE
H&P Update:  Krys North was seen and examined. History and physical has been reviewed. The patient has been examined.  There have been no significant clinical changes since the completion of the originally dated History and Physical.    Veronica Bullard MD  Orthopaedic Surgery  09/07/23  9:09 AM

## 2023-09-08 NOTE — OP NOTE
further surgery,  weakness, stiffness, risk of death and potential risk of other unforseen complication. The patient consented to the procedure after discussion of the risks and benefits. DESCRIPTION OF PROCEDURE:     The patient was identified in the holding room. The right middle finger was marked and confirmed as the correct operative site. They were then brought to the OR and transferred onto the OR table in the supine position. All bony prominences were well padded. SCDs were placed on the bilateral legs throughout the case. A timeout was performed, verifying the correct patient, the correct side  and the correct procedure. Antibiotics were then administered, and were redosed during the procedure as needed at indicated intervals. A non-sterile tourniquet was placed on the arm. The upper extremity was pre scrubbed and then prepped and draped in routine sterile fashion. An incisional timeout was performed re-confirming the correct patient, surgical site and procedure, as well as verifying antibiotics. The finger was anesthetized with 5 cc of 2% lidocaine plain, and a J-shaped incision was made on the dorsal aspect of the middle finger, the mucous cyst was excised along with the skin overlying it given that it was traumatized and thin, the DIP joint was accessed through the dorsal aspect, a rongeur was used to denude the remaining cartilage and subchondral bone, a 2.0 headless compression screw was then advanced in retrograde fashion, good compression was achieved, local advancement of the skin was then performed in order to cover the defect, this was secured with 4-0 Prolene sutures. A sterile dressing was applied followed by a  compressive dressing     The patient was awakened and taken to PACU in stable condition. All sponge and needle counts were correct at the end of the case. I was present and scrubbed for the entire procedure.       DISPOSITION:    The patient will be discharged home. Weightbearing status: NWB       CHEMICAL VTE (DVT) PROPHYLAXIS: None warranted for this case     FOLLOW-UP:  10-14 days for wound check and XRs if needed.      Deniz Virk MD    09/08/23  10:53 AM

## 2023-09-22 ENCOUNTER — OFFICE VISIT (OUTPATIENT)
Dept: ORTHOPEDIC SURGERY | Age: 58
End: 2023-09-22

## 2023-09-22 ENCOUNTER — EVALUATION (OUTPATIENT)
Age: 58
End: 2023-09-22

## 2023-09-22 DIAGNOSIS — M15.1 DEGENERATIVE ARTHRITIS OF DISTAL INTERPHALANGEAL JOINT OF MIDDLE FINGER OF RIGHT HAND: ICD-10-CM

## 2023-09-22 DIAGNOSIS — M15.1 DEGENERATIVE ARTHRITIS OF DISTAL INTERPHALANGEAL JOINT OF MIDDLE FINGER OF RIGHT HAND: Primary | ICD-10-CM

## 2023-09-22 DIAGNOSIS — Z78.9 DECREASED ACTIVITIES OF DAILY LIVING (ADL): ICD-10-CM

## 2023-09-22 DIAGNOSIS — M25.641 STIFFNESS OF FINGER JOINT OF RIGHT HAND: Primary | ICD-10-CM

## 2023-09-22 DIAGNOSIS — M25.60 DECREASED RANGE OF MOTION: ICD-10-CM

## 2023-09-22 NOTE — PROGRESS NOTES
Orthopaedic Hand Surgery Note    Name: Donaldo Pollard  Age: 60 y.o. YOB: 1965  Gender: female  MRN: [id number]    Post Operative Visit: HAND LESION EXCISION right middle finger - Right and DIP JOINT ARTHRODESIS right middle finger - Right    HPI: Patient is status post HAND LESION EXCISION right middle finger - Right and DIP JOINT ARTHRODESIS right middle finger - Right on 9/7/2023. Patient reports minimal pain. Physical Examination:  Well-healed surgical wounds. Sensation is intact in all fingers. Motor exam reveals no deficits. Mild erythema and swelling in the surgical site as expected. Imaging:     right middle finger XR: AP, Lateral, Oblique views     Clinical Indication  1. 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   Therapy referral for DIP protection splint, I will reassess the patient in 4 weeks to ensure that she is progressing well    Alpesh Cox MD  09/22/23  11:18 AM

## 2023-09-22 NOTE — PROGRESS NOTES
[facility] [address] [address]  Dept: [phone]   Occupational Therapy Orthosis Note     Referring MD: Alvino Burroughs MD  Date: 9/22/2023  Diagnosis:    Diagnosis Orders   1. Stiffness of finger joint of right hand        2. Decreased activities of daily living (ADL)        3. Decreased range of motion           Therapy Precautions:  R MF DIP arthrodesis    Total Timed Codes: 0 min, Total Treatment Time: 30 min  Modifier needed: No  Episode visit count:  1     PMH:   Affected Extremity: right    Date of Injury: Patient is status post hand lesion excision right middle finger and Right MF DIP joint arthrodesis 9/7/23   Date of Surgery: 9/7/23      Wound/Pin/Incision: well healed    ORTHOSIS ISSUED:   : FO (Finger orthosis) C/F (custom fabricated) without joints, include soft interface, straps, includes fitting and adjustment.      Right MF DIP tip protector splint    TREATMENT PROVIDED:   Patient instructed in purpose, care, and precaution of orthosis wearing, Patient instructed in wearing schedule, and Patient instructed on pin/wound care and signs of infection    WEAR SCHEDULE:   At all times, hygiene    CARE OF ORTHOSIS AND PRECAUTIONS REVIEWED:   The orthosis can be cleaned with soap and water, rubbing alcohol, or a mild cleanser  Keep away from any direct source of heat, it will melt and/or lose it's shape  Keep away from dogs and/or pets that will chew the orthosis  Should the orthosis result in increased pain, numbness/tingling, increased swelling, or overall worsening of condition, patient is to contact the office immediately during business hours     TREATMENT GOALS:   Patient to demonstrate independence with donning/doffing orthosis in one visit, Patient to verbalize understanding of inspecting skin to prevent pressure areas and allergic reaction due to orthosis, Patient to verbalize understanding of precautions and necessity of wearing orthosis as

## 2023-10-25 ENCOUNTER — OFFICE VISIT (OUTPATIENT)
Dept: ORTHOPEDIC SURGERY | Age: 58
End: 2023-10-25

## 2023-10-25 DIAGNOSIS — M15.1 DEGENERATIVE ARTHRITIS OF DISTAL INTERPHALANGEAL JOINT OF MIDDLE FINGER OF RIGHT HAND: Primary | ICD-10-CM

## 2023-10-25 NOTE — PROGRESS NOTES
Orthopaedic Hand Surgery Note    Name: Dave Barajas  Age: 60 y.o. YOB: 1965  Gender: female  MRN: [id number]    Post Operative Visit: HAND LESION EXCISION right middle finger - Right and DIP JOINT ARTHRODESIS right middle finger - Right    HPI: Patient is status post HAND LESION EXCISION right middle finger - Right and DIP JOINT ARTHRODESIS right middle finger - Right on 9/7/2023. Patient reports no pain. Physical Examination:  Well-healed surgical wounds. Sensation is intact in all fingers. Motor exam reveals no deficits. Mild swelling. Imaging:     right middle finger XR: AP, Lateral, Oblique views     Clinical Indication  1. 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   Motion as tolerated, progression directed as tolerated, she will use Coban to help with the swelling, I will reassess in 2 months with final x-rays to ensure this is fully healed, there is some areas of lucency but there is good bone contact and the hardware has not loosened plus, the patient has no pain    Torsten Barrios MD  10/25/23  11:57 AM

## 2023-12-15 ENCOUNTER — OFFICE VISIT (OUTPATIENT)
Dept: ORTHOPEDIC SURGERY | Age: 58
End: 2023-12-15

## 2023-12-15 DIAGNOSIS — M67.441 DIGITAL MUCOUS CYST OF FINGER OF RIGHT HAND: Primary | ICD-10-CM

## 2023-12-15 DIAGNOSIS — M15.1 DEGENERATIVE ARTHRITIS OF DISTAL INTERPHALANGEAL JOINT OF MIDDLE FINGER OF RIGHT HAND: ICD-10-CM

## 2023-12-15 NOTE — PROGRESS NOTES
1. Digital mucous cyst of finger of right hand    2. 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 The x-ray does show some presence of a joint line but overall good bony bridging, she will follow-up on an as-needed basis    right middle finger XR: AP, Lateral, Oblique views     Clinical Indication  1. Digital mucous cyst of finger of right hand    2.  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

## 2024-01-18 ENCOUNTER — HOSPITAL ENCOUNTER (OUTPATIENT)
Dept: MAMMOGRAPHY | Age: 59
Discharge: HOME | End: 2024-01-18
Payer: COMMERCIAL

## 2024-01-18 DIAGNOSIS — Z12.31 VISIT FOR SCREENING MAMMOGRAM: ICD-10-CM

## 2024-01-18 PROCEDURE — 77067 SCR MAMMO BI INCL CAD: CPT

## 2024-05-16 NOTE — PROCEDURE: COUNSELING
Anesthesia Post-op Note    Patient: Treasure Flaherty  Procedure(s) Performed: LEFT EXTRACTION, CATARACT, WITH IOL INSERTION (Left: Eye)   Anesthesia type: MAC    Vitals Value Taken Time   Temp 36.6 °C (97.9 °F) 05/16/24 1122   Pulse 69 05/16/24 1130   Resp 14 05/16/24 1130   SpO2 93 % 05/16/24 1130   /72 05/16/24 1130         Patient Location: Phase II  Post-op Vital Signs:stable  Level of Consciousness: participates in exam, oriented and awake  Respiratory Status: spontaneous ventilation, unassisted and room air  Cardiovascular blood pressure returned to baseline  Hydration: euvolemic  Pain Management: well controlled  Handoff: Handoff to receiving clinician was performed and questions were answered  Vomiting: none  Nausea: None  Airway Patency:patent  Post-op Assessment: awake, alert, appropriately conversant, or baseline, no complications and patient tolerated procedure well      No notable events documented.                      
Detail Level: Generalized
Detail Level: Detailed

## 2025-01-22 ENCOUNTER — TRANSCRIBE ORDERS (OUTPATIENT)
Dept: SCHEDULING | Age: 60
End: 2025-01-22

## 2025-01-22 DIAGNOSIS — Z12.31 OTHER SCREENING MAMMOGRAM: Primary | ICD-10-CM

## 2025-03-05 ENCOUNTER — HOSPITAL ENCOUNTER (OUTPATIENT)
Dept: MAMMOGRAPHY | Age: 60
Discharge: HOME | End: 2025-03-08
Payer: COMMERCIAL

## 2025-03-05 DIAGNOSIS — Z12.31 OTHER SCREENING MAMMOGRAM: ICD-10-CM

## 2025-03-05 PROCEDURE — 77063 BREAST TOMOSYNTHESIS BI: CPT

## 2025-06-04 NOTE — ED NOTES
Unable to print clinical collect labels, error on clinical collect, contacted lab to notify them of the issue, lab states they are unable to take the lab work with a patient label, contacted help desk, waiting for a return phone call. [de-identified] : On exam there is bruising and ecchymosis and tenderness at the left middle finger proximal phalanx.  There is angular and rotational deformity.  Nontender over index ring or small fingers.  MCP joint stable [de-identified] : PA lateral oblique x-rays demonstrated displaced comminuted left middle finger proximal phalanx fracture with 4 degrees of dorsal angulation and slight angular deformity

## (undated) DEVICE — SOLUTION IRRIG 1000ML 0.9% SOD CHL USP POUR PLAS BTL

## (undated) DEVICE — STRIP,CLOSURE,WOUND,MEDI-STRIP,1/2X4: Brand: MEDLINE

## (undated) DEVICE — KNEE ARTHROSCOPY DR KOCH: Brand: MEDLINE INDUSTRIES, INC.

## (undated) DEVICE — OCCLUSIVE GAUZE STRIP,3% BISMUTH TRIBROMOPHENATE IN PETROLATUM BLEND: Brand: XEROFORM

## (undated) DEVICE — STRETCH BANDAGE ROLL: Brand: DERMACEA

## (undated) DEVICE — BANDAGE GZ W2XL75IN ST RAYON POLY CNFRM STRTCH LTWT

## (undated) DEVICE — Device

## (undated) DEVICE — PADDING CAST W4INXL4YD ST COT COHESIVE HND TEARABLE SPEC

## (undated) DEVICE — SYR 10ML LUER LOK 1/5ML GRAD --

## (undated) DEVICE — WEREWOLF FLOW 90 COBLATION WAND: Brand: COBLATION

## (undated) DEVICE — SUT CHRMC 5-0 27IN RB1 BRN --

## (undated) DEVICE — SOL HYDOGEN PEROXIDE 3% 4OZ -- MEDICHOICE

## (undated) DEVICE — NEEDLE HYPO 25GA L1.5IN BVL ORIENTED ECLIPSE

## (undated) DEVICE — GLOVE ORANGE PI 7 1/2   MSG9075

## (undated) DEVICE — BANDAGE COMPR L5YDXW2IN FOAM CO FLX

## (undated) DEVICE — TOURNIQUET PHLEB L EXSANGUINATING FOR AD DGT TOURNI-COT

## (undated) DEVICE — PADDING CAST W3INXL4YD COT BLEND MIC PLEAT UNDERCAST SPEC

## (undated) DEVICE — DISPOSABLE BIPOLAR CODE, 12' (3.66 M): Brand: CONMED

## (undated) DEVICE — 4-PORT MANIFOLD: Brand: NEPTUNE 2

## (undated) DEVICE — DRAPE, FILM SHEET, 44X65 STERILE: Brand: MEDLINE

## (undated) DEVICE — SURGICAL PROCEDURE PACK BASIC ST FRANCIS

## (undated) DEVICE — GUIDEWIRE ORTHOPEDIC 0.8X100 MM TROCAR PT 1 END

## (undated) DEVICE — STOCKINETTE,IMPERVIOUS,12X48,STERILE: Brand: MEDLINE

## (undated) DEVICE — AMD ANTIMICROBIAL NON-ADHERENT PAD,0.2% POLYHEXAMETHYLENE BIGUANIDE HCI (PHMB): Brand: TELFA

## (undated) DEVICE — NDL FLTR TIP 5 MIC 18GX1.5IN --

## (undated) DEVICE — SOLUTION IRRIG 3000ML 0.9% SOD CHL FLX CONT 0797208] ICU MEDICAL INC]

## (undated) DEVICE — SUTURE VCRL RAPIDE SZ 4-0 L18IN ABSRB UD L19MM PS-2 1/2 CIR VR496

## (undated) DEVICE — BNDG,ELSTC,MATRIX,STRL,2"X5YD,LF,HOOK&LP: Brand: MEDLINE

## (undated) DEVICE — SYSTEM CPRP CONC UP TO 18X BASELINE ADJ LEUK CONC ANGEL

## (undated) DEVICE — BANDAGE COMPR W1INXL5YD FOAM COHESIVE QUIK STK SELF ADH SFT

## (undated) DEVICE — BLADE SHV CUT MENIS AGG + 4MM --

## (undated) DEVICE — INTENDED FOR TISSUE SEPARATION, AND OTHER PROCEDURES THAT REQUIRE A SHARP SURGICAL BLADE TO PUNCTURE OR CUT.: Brand: BARD-PARKER ® STAINLESS STEEL BLADES

## (undated) DEVICE — T-DRAPE,EXTREMITY,STERILE: Brand: MEDLINE

## (undated) DEVICE — BANDAGE COMPR 9 FTX4 IN SMOOTH COMFORTABLE SYNTH ESMRK LF

## (undated) DEVICE — BANDAGE COBAN 6 IN WND 6INX5YD FOAM

## (undated) DEVICE — (D)PREP SKN CHLRAPRP APPL 26ML -- CONVERT TO ITEM 371833

## (undated) DEVICE — SET IRRIG W 96IN TBNG 4 LN FLX BG

## (undated) DEVICE — SUTURE MCRYL SZ 3-0 L27IN ABSRB UD L19MM PS-2 3/8 CIR PRIM Y427H

## (undated) DEVICE — DRAPE C ARM W54XL84IN MINI FOR OEC 6800

## (undated) DEVICE — NDL SPNE QNCKE 18GX3.5IN LF --

## (undated) DEVICE — SET IRRIG DST FLX M CONN

## (undated) DEVICE — AMD ANTIMICROBIAL GAUZE SPONGES,12 PLY USP TYPE VII, 0.2% POLYHEXAMETHYLENE BIGUANIDE HCI (PHMB): Brand: CURITY

## (undated) DEVICE — STERILE HOOK LOCK LATEX FREE ELASTIC BANDAGE 6INX5YD: Brand: HOOK LOCK™

## (undated) DEVICE — DRAPE,U/SHT,SPLIT,FILM,60X84,STERILE: Brand: MEDLINE

## (undated) DEVICE — ZIMMER® STERILE DISPOSABLE TOURNIQUET CUFF WITH PLC, DUAL PORT, SINGLE BLADDER, 18 IN. (46 CM)

## (undated) DEVICE — BANDAGE,ELASTIC,ESMARK,STERILE,4"X9',LF: Brand: MEDLINE

## (undated) DEVICE — SUTURE MCRYL SZ 5-0 L18IN ABSRB UD L13MM P-3 3/8 CIR PRIM Y493G

## (undated) DEVICE — PREP SKN CHLRAPRP APL 26ML STR --

## (undated) DEVICE — PUSHER KNOT SUT CUT DISP --

## (undated) DEVICE — DRESSING,GAUZE,XEROFORM,CURAD,1"X8",ST: Brand: CURAD

## (undated) DEVICE — DRAPE,HAND,STERILE: Brand: MEDLINE

## (undated) DEVICE — SYR LR LCK 1ML GRAD NSAF 30ML --

## (undated) DEVICE — SOFT SILICONE HYDROCELLULAR FOAM DRESSING WITH LOCK AWAY LAYER: Brand: ALLEVYN LIFE XL 21X21 CTN10

## (undated) DEVICE — SUTURE ETHLN SZ 4-0 L18IN NONABSORBABLE BLK L19MM PS-2 3/8 1667H

## (undated) DEVICE — HAND PACK: Brand: MEDLINE INDUSTRIES, INC.